# Patient Record
Sex: FEMALE | Race: WHITE | NOT HISPANIC OR LATINO | Employment: FULL TIME | URBAN - METROPOLITAN AREA
[De-identification: names, ages, dates, MRNs, and addresses within clinical notes are randomized per-mention and may not be internally consistent; named-entity substitution may affect disease eponyms.]

---

## 2017-01-21 ENCOUNTER — APPOINTMENT (OUTPATIENT)
Dept: RADIOLOGY | Facility: MEDICAL CENTER | Age: 27
End: 2017-01-21
Attending: EMERGENCY MEDICINE
Payer: MEDICAID

## 2017-01-21 ENCOUNTER — HOSPITAL ENCOUNTER (EMERGENCY)
Facility: MEDICAL CENTER | Age: 27
End: 2017-01-22
Attending: EMERGENCY MEDICINE
Payer: MEDICAID

## 2017-01-21 DIAGNOSIS — O23.41 UTI IN PREGNANCY, FIRST TRIMESTER: ICD-10-CM

## 2017-01-21 DIAGNOSIS — N39.0 URINARY TRACT INFECTION WITHOUT HEMATURIA, SITE UNSPECIFIED: ICD-10-CM

## 2017-01-21 DIAGNOSIS — O20.0 THREATENED MISCARRIAGE: ICD-10-CM

## 2017-01-21 DIAGNOSIS — N93.9 VAGINAL BLEEDING: ICD-10-CM

## 2017-01-21 LAB
ALBUMIN SERPL BCP-MCNC: 4.4 G/DL (ref 3.2–4.9)
ALBUMIN/GLOB SERPL: 1.4 G/DL
ALP SERPL-CCNC: 39 U/L (ref 30–99)
ALT SERPL-CCNC: 30 U/L (ref 2–50)
ANION GAP SERPL CALC-SCNC: 8 MMOL/L (ref 0–11.9)
AST SERPL-CCNC: 18 U/L (ref 12–45)
BASOPHILS # BLD AUTO: 0.8 % (ref 0–1.8)
BASOPHILS # BLD: 0.09 K/UL (ref 0–0.12)
BILIRUB SERPL-MCNC: 0.7 MG/DL (ref 0.1–1.5)
BUN SERPL-MCNC: 10 MG/DL (ref 8–22)
CALCIUM SERPL-MCNC: 9.6 MG/DL (ref 8.5–10.5)
CHLORIDE SERPL-SCNC: 97 MMOL/L (ref 96–112)
CO2 SERPL-SCNC: 20 MMOL/L (ref 20–33)
CREAT SERPL-MCNC: 0.64 MG/DL (ref 0.5–1.4)
EOSINOPHIL # BLD AUTO: 0.09 K/UL (ref 0–0.51)
EOSINOPHIL NFR BLD: 0.8 % (ref 0–6.9)
ERYTHROCYTE [DISTWIDTH] IN BLOOD BY AUTOMATED COUNT: 41.7 FL (ref 35.9–50)
GFR SERPL CREATININE-BSD FRML MDRD: >60 ML/MIN/1.73 M 2
GLOBULIN SER CALC-MCNC: 3.2 G/DL (ref 1.9–3.5)
GLUCOSE SERPL-MCNC: 93 MG/DL (ref 65–99)
HCT VFR BLD AUTO: 41.4 % (ref 37–47)
HGB BLD-MCNC: 13.8 G/DL (ref 12–16)
IMM GRANULOCYTES # BLD AUTO: 0.04 K/UL (ref 0–0.11)
IMM GRANULOCYTES NFR BLD AUTO: 0.4 % (ref 0–0.9)
LYMPHOCYTES # BLD AUTO: 2.96 K/UL (ref 1–4.8)
LYMPHOCYTES NFR BLD: 27.8 % (ref 22–41)
MCH RBC QN AUTO: 29.3 PG (ref 27–33)
MCHC RBC AUTO-ENTMCNC: 33.3 G/DL (ref 33.6–35)
MCV RBC AUTO: 87.9 FL (ref 81.4–97.8)
MONOCYTES # BLD AUTO: 0.8 K/UL (ref 0–0.85)
MONOCYTES NFR BLD AUTO: 7.5 % (ref 0–13.4)
NEUTROPHILS # BLD AUTO: 6.66 K/UL (ref 2–7.15)
NEUTROPHILS NFR BLD: 62.7 % (ref 44–72)
NRBC # BLD AUTO: 0 K/UL
NRBC BLD AUTO-RTO: 0 /100 WBC
NUMBER OF RH DOSES IND 8505RD: NORMAL
PLATELET # BLD AUTO: 297 K/UL (ref 164–446)
PMV BLD AUTO: 9.8 FL (ref 9–12.9)
POTASSIUM SERPL-SCNC: 4.6 MMOL/L (ref 3.6–5.5)
PROT SERPL-MCNC: 7.6 G/DL (ref 6–8.2)
RBC # BLD AUTO: 4.71 M/UL (ref 4.2–5.4)
RH BLD: NORMAL
SODIUM SERPL-SCNC: 125 MMOL/L (ref 135–145)
WBC # BLD AUTO: 10.6 K/UL (ref 4.8–10.8)

## 2017-01-21 PROCEDURE — 700105 HCHG RX REV CODE 258: Performed by: EMERGENCY MEDICINE

## 2017-01-21 PROCEDURE — 86901 BLOOD TYPING SEROLOGIC RH(D): CPT

## 2017-01-21 PROCEDURE — 96374 THER/PROPH/DIAG INJ IV PUSH: CPT

## 2017-01-21 PROCEDURE — 99284 EMERGENCY DEPT VISIT MOD MDM: CPT

## 2017-01-21 PROCEDURE — 85025 COMPLETE CBC W/AUTO DIFF WBC: CPT

## 2017-01-21 PROCEDURE — 84703 CHORIONIC GONADOTROPIN ASSAY: CPT

## 2017-01-21 PROCEDURE — 80053 COMPREHEN METABOLIC PANEL: CPT

## 2017-01-21 PROCEDURE — 96361 HYDRATE IV INFUSION ADD-ON: CPT

## 2017-01-21 PROCEDURE — 700111 HCHG RX REV CODE 636 W/ 250 OVERRIDE (IP): Performed by: EMERGENCY MEDICINE

## 2017-01-21 RX ORDER — ONDANSETRON 2 MG/ML
4 INJECTION INTRAMUSCULAR; INTRAVENOUS ONCE
Status: DISCONTINUED | OUTPATIENT
Start: 2017-01-21 | End: 2017-01-22 | Stop reason: HOSPADM

## 2017-01-21 RX ORDER — ONDANSETRON 2 MG/ML
4 INJECTION INTRAMUSCULAR; INTRAVENOUS ONCE
Status: COMPLETED | OUTPATIENT
Start: 2017-01-21 | End: 2017-01-21

## 2017-01-21 RX ORDER — SODIUM CHLORIDE 9 MG/ML
1000 INJECTION, SOLUTION INTRAVENOUS ONCE
Status: COMPLETED | OUTPATIENT
Start: 2017-01-21 | End: 2017-01-21

## 2017-01-21 RX ADMIN — SODIUM CHLORIDE 1000 ML: 9 INJECTION, SOLUTION INTRAVENOUS at 22:58

## 2017-01-21 RX ADMIN — ONDANSETRON 4 MG: 2 INJECTION, SOLUTION INTRAMUSCULAR; INTRAVENOUS at 22:58

## 2017-01-21 NOTE — ED AVS SNAPSHOT
After Visit Summary                                                                                                                Natalya Salguero   MRN: [id number]    Department:  [facility], Emergency Dept   Date of Visit:  1/21/2017            [facility], Emergency Dept    [address]    [place] [address]    Phone:  [phone]      You were seen by     Larissa Vazquez M.D.      Your Diagnosis Was     Threatened miscarriage     O20.0       These are the medications you received during your hospitalization from 01/21/2017 1939 to 01/22/2017 0102     Date/Time Order Dose Route Action    01/21/2017 2258 NS infusion 1,000 mL 1,000 mL Intravenous New Bag    01/21/2017 2258 ondansetron (ZOFRAN) syringe/vial injection 4 mg 4 mg Intravenous Given      Follow-up Information     1. Follow up with Please follow up with your OB as scheduled .      Medication Information     Review all of your home medications and newly ordered medications with your primary doctor and/or pharmacist as soon as possible. Follow medication instructions as directed by your doctor and/or pharmacist.     Please keep your complete medication list with you and share with your physician. Update the information when medications are discontinued, doses are changed, or new medications (including over-the-counter products) are added; and carry medication information at all times in the event of emergency situations.               Medication List      START taking these medications        Instructions    nitrofurantoin monohydr macro 100 MG Caps   Commonly known as:  MACROBID    Take 1 Cap by mouth 2 times a day for 10 days.   Dose:  100 mg         ASK your doctor about these medications        Instructions    albuterol 108 (90 BASE) MCG/ACT Aers inhalation aerosol    Inhale 2 Puffs by mouth every 6 hours as needed for Shortness of Breath.   Dose:  2 Puff       buPROPion 75 MG Tabs   Commonly known as:   WELLBUTRIN    TAKE 1 TABLET(S) TWICE A DAY BY ORAL ROUTE AS DIRECTED FOR 30 DAYS.       busPIRone 5 MG Tabs   Commonly known as:  BUSPAR    TAKE 1 TABLET(S) 3 TIMES A DAY BY ORAL ROUTE AS DIRECTED FOR 30 DAYS.       Misc. Devices Misc    Doctor's comments:  Wrist brace   As directed       omeprazole 20 MG tablet   Commonly known as:  PRILOSEC OTC    Take 1 Tab by mouth 1 time daily as needed.   Dose:  20 mg               Procedures and tests performed during your visit     BETA-HCG QUALITATIVE SERUM    CBC WITH DIFFERENTIAL    COMP METABOLIC PANEL    ESTIMATED GFR    IV Saline Lock    REFRACTOMETER SG    RH TYPE FOR RHOGAM FROM E.D.    Set Up for Pelvic Exam    URINALYSIS CULTURE, IF INDICATED    URINE MICROSCOPIC (W/UA)    US-OB PELVIS TRANSVAGINAL        Discharge Instructions       Pregnancy and Urinary Tract Infection  A urinary tract infection (UTI) is a bacterial infection of the urinary tract. Infection of the urinary tract can include the ureters, kidneys (pyelonephritis), bladder (cystitis), and urethra (urethritis). All pregnant women should be screened for bacteria in the urinary tract. Identifying and treating a UTI will decrease the risk of  labor and developing more serious infections in both the mother and baby.  CAUSES  Bacteria germs cause almost all UTIs.   RISK FACTORS  Many factors can increase your chances of getting a UTI during pregnancy. These include:  · Having a short urethra.  · Poor toilet and hygiene habits.  · Sexual intercourse.  · Blockage of urine along the urinary tract.  · Problems with the pelvic muscles or nerves.  · Diabetes.  · Obesity.  · Bladder problems after having several children.  · Previous history of UTI.  SIGNS AND SYMPTOMS   · Pain, burning, or a stinging feeling when urinating.  · Suddenly feeling the need to urinate right away (urgency).  · Loss of bladder control (urinary incontinence).  · Frequent urination, more than is common with pregnancy.  · Lower  abdominal or back discomfort.  · Cloudy urine.  · Blood in the urine (hematuria).  · Fever.   When the kidneys are infected, the symptoms may be:  · Back pain.  · Flank pain on the right side more so than the left.  · Fever.  · Chills.  · Nausea.  · Vomiting.  DIAGNOSIS   A urinary tract infection is usually diagnosed through urine tests. Additional tests and procedures are sometimes done. These may include:  · Ultrasound exam of the kidneys, ureters, bladder, and urethra.  · Looking in the bladder with a lighted tube (cystoscopy).  TREATMENT  Typically, UTIs can be treated with antibiotic medicines.   HOME CARE INSTRUCTIONS   · Only take over-the-counter or prescription medicines as directed by your health care provider. If you were prescribed antibiotics, take them as directed. Finish them even if you start to feel better.  · Drink enough fluids to keep your urine clear or pale yellow.  · Do not have sexual intercourse until the infection is gone and your health care provider says it is okay.  · Make sure you are tested for UTIs throughout your pregnancy. These infections often come back.   Preventing a UTI in the Future  · Practice good toilet habits. Always wipe from front to back. Use the tissue only once.  · Do not hold your urine. Empty your bladder as soon as possible when the urge comes.  · Do not douche or use deodorant sprays.  · Wash with soap and warm water around the genital area and the anus.  · Empty your bladder before and after sexual intercourse.  · Wear underwear with a cotton crotch.  · Avoid caffeine and carbonated drinks. They can irritate the bladder.  · Drink cranberry juice or take cranberry pills. This may decrease the risk of getting a UTI.  · Do not drink alcohol.  · Keep all your appointments and tests as scheduled.   SEEK MEDICAL CARE IF:   · Your symptoms get worse.  · You are still having fevers 2 or more days after treatment begins.  · You have a rash.  · You feel that you are  having problems with medicines prescribed.  · You have abnormal vaginal discharge.  SEEK IMMEDIATE MEDICAL CARE IF:   · You have back or flank pain.  · You have chills.  · You have blood in your urine.  · You have nausea and vomiting.  · You have contractions of your uterus.  · You have a gush of fluid from the vagina.  MAKE SURE YOU:  · Understand these instructions.    · Will watch your condition.    · Will get help right away if you are not doing well or get worse.       This information is not intended to replace advice given to you by your health care provider. Make sure you discuss any questions you have with your health care provider.     Document Released: 04/13/2012 Document Revised: 10/08/2014 Document Reviewed: 07/17/2014  [employer] Interactive Patient Education ©2016 [employer] Inc.    Threatened Miscarriage  A threatened miscarriage is when you have vaginal bleeding during your first 20 weeks of pregnancy but the pregnancy has not ended. Your doctor will do tests to make sure you are still pregnant. The cause of the bleeding may not be known. This condition does not mean your pregnancy will end. It does increase the risk of it ending (complete miscarriage).  HOME CARE   · Make sure you keep all your doctor visits for prenatal care.  · Get plenty of rest.  · Do not have sex or use tampons if you have vaginal bleeding.  · Do not douche.  · Do not smoke or use drugs.  · Do not drink alcohol.  · Avoid caffeine.  GET HELP IF:  · You have light bleeding from your vagina.  · You have belly pain or cramping.  · You have a fever.  GET HELP RIGHT AWAY IF:   · You have heavy bleeding from your vagina.  · You have clots of blood coming from your vagina.  · You have bad pain or cramps in your low back or belly.  · You have fever, chills, and bad belly pain.  MAKE SURE YOU:   · Understand these instructions.  · Will watch your condition.  · Will get help right away if you are not doing well or get worse.     This  information is not intended to replace advice given to you by your health care provider. Make sure you discuss any questions you have with your health care provider.     Document Released: 11/30/2009 Document Revised: 12/23/2014 Document Reviewed: 10/14/2014  Elsevier Interactive Patient Education ©2016 [employer]            Patient Information     Patient Information    Following emergency treatment: all patient requiring follow-up care must return either to a private physician or a clinic if your condition worsens before you are able to obtain further medical attention, please return to the emergency room.     Billing Information    At [facility] at [facility], we work to make the billing process streamlined for our patients.  Our Representatives are here to answer any questions you may have regarding your hospital bill.  If you have insurance coverage and have supplied your insurance information to us, we will submit a claim to your insurer on your behalf.  Should you have any questions regarding your bill, we can be reached online or by phone as follows:  Online: You are able pay your bills online or live chat with our representatives about any billing questions you may have. We are here to help Monday - Friday from 8:00am to 7:30pm and 9:00am - 12:00pm on Saturdays.  Please visit [email or web address]  for more information.   Phone:  [phone] or [phone]    Please note that your emergency physician, surgeon, pathologist, radiologist, anesthesiologist, and other specialists are not employed by [facility] and will therefore bill separately for their services.  Please contact them directly for any questions concerning their bills at the numbers below:     Emergency Physician Services:  [phone]  [facility]:  [phone]  [facility]:  [phone]  [facility]:  [phone]    1. Your final bill may vary from the amount quoted  upon discharge if all procedures are not complete at that time, or if your doctor has additional procedures of which we are not aware. You will receive an additional bill if you return to the Emergency Department at [facility] for suture removal regardless of the facility of which the sutures were placed.     2. Please arrange for settlement of this account at the emergency registration.    3. All self-pay accounts are due in full at the time of treatment.  If you are unable to meet this obligation then payment is expected within 4-5 days.     4. If you have had radiology studies (CT, X-ray, Ultrasound, MRI), you have received a preliminary result during your emergency department visit. Please contact the radiology department [phone] to receive a copy of your final result. Please discuss the Final result with your primary physician or with the follow up physician provided.     Crisis Hotline:  [place] Crisis Hotline:  6-916-UGGMXYJ or 1-692.969.8923  [place] Crisis Hotline:    1-157.457.4072 or 991-737-1986         ED Discharge Follow Up Questions    1. In order to provide you with very good care, we would like to follow up with a phone call in the next few days.  May we have your permission to contact you?     YES /  NO    2. What is the best phone number to call you? (       )_____-__________    3. What is the best time to call you?      Morning  /  Afternoon  /  Evening                   Patient Signature:  ____________________________________________________________    Date:  ____________________________________________________________

## 2017-01-21 NOTE — ED AVS SNAPSHOT
1/22/2017          Natalya Salguero  [address]   [address]  [place] [address]    Dear Natalya:    [facility] wants to ensure your discharge home is safe and you or your loved ones have had all your questions answered regarding your care after you leave the hospital.    You may receive a telephone call within two days of your discharge.  This call is to make certain you understand your discharge instructions as well as ensure we provided you with the best care possible during your stay with us.     The call will only last approximately 3-5 minutes and will be done by a nurse.    Once again, we want to ensure your discharge home is safe and that you have a clear understanding of any next steps in your care.  If you have any questions or concerns, please do not hesitate to contact us, we are here for you.  Thank you for choosing [facility] for your healthcare needs.    Sincerely,    Zachary Mcclellan    [facility]

## 2017-01-21 NOTE — ED AVS SNAPSHOT
[facility] [employer] Access Code: [email or web address]  Expires: 2/21/2017  1:02 AM    [facility]  A secure, online tool to manage your health information     [employer]’s [employer]® is a secure, online tool that connects you to your personalized health information from the privacy of your home -- day or night - making it very easy for you to manage your healthcare. Once the activation process is completed, you can even access your medical information using the [employer] jae, which is available for free in the Apple Jae store or Google Play store.     [facility] [facility] provides the following levels of access (as shown below):   My Chart Features   [facility] Primary Care Doctor [facility]  Specialists [facility]  Urgent  Care Non-[facility]  Primary Care  Doctor   Email your healthcare team securely and privately 24/7 X X X X   Manage appointments: schedule your next appointment; view details of past/upcoming appointments X      Request prescription refills. X      View recent personal medical records, including lab and immunizations X X X X   View health record, including health history, allergies, medications X X X X   Read reports about your outpatient visits, procedures, consult and ER notes X X X X   See your discharge summary, which is a recap of your hospital and/or ER visit that includes your diagnosis, lab results, and care plan. X X       How to register for [employer]:  1. Go to  [email or web address].  2. Click on the Sign Up Now box, which takes you to the New Member Sign Up page. You will need to provide the following information:  a. Enter your [employer] [employer] Access Code exactly as it appears at the top of this page. (You will not need to use this code after you’ve completed the sign-up process. If you do not sign up before the expiration date, you must request a new code.)   b. Enter your date of birth.   c. Enter your home email address.   d. Click Submit, and follow the next screen’s instructions.  3. Create a [facility] ID. This will be your [facility]  login ID and cannot be changed, so think of one that is secure and easy to remember.  4. Create a [employer] password. You can change your password at any time.  5. Enter your Password Reset Question and Answer. This can be used at a later time if you forget your password.   6. Enter your e-mail address. This allows you to receive e-mail notifications when new information is available in [employer].  7. Click Sign Up. You can now view your health information.    For assistance activating your [employer] account, call [phone]

## 2017-01-22 VITALS
HEIGHT: 62 IN | SYSTOLIC BLOOD PRESSURE: 102 MMHG | OXYGEN SATURATION: 97 % | TEMPERATURE: 97.5 F | BODY MASS INDEX: 31.73 KG/M2 | WEIGHT: 172.4 LBS | DIASTOLIC BLOOD PRESSURE: 68 MMHG | RESPIRATION RATE: 14 BRPM | HEART RATE: 65 BPM

## 2017-01-22 LAB
APPEARANCE UR: CLEAR
BACTERIA #/AREA URNS HPF: ABNORMAL /HPF
BILIRUB UR QL STRIP.AUTO: NEGATIVE
COLOR UR: YELLOW
CULTURE IF INDICATED INDCX: YES UA CULTURE
EPI CELLS #/AREA URNS HPF: ABNORMAL /HPF
GLUCOSE UR STRIP.AUTO-MCNC: NEGATIVE MG/DL
HCG SERPL QL: POSITIVE
HYALINE CASTS #/AREA URNS LPF: ABNORMAL /LPF
KETONES UR STRIP.AUTO-MCNC: 80 MG/DL
LEUKOCYTE ESTERASE UR QL STRIP.AUTO: ABNORMAL
MICRO URNS: ABNORMAL
MUCOUS THREADS #/AREA URNS HPF: ABNORMAL /HPF
NITRITE UR QL STRIP.AUTO: NEGATIVE
PH UR STRIP.AUTO: 5.5 [PH]
PROT UR QL STRIP: NEGATIVE MG/DL
RBC # URNS HPF: ABNORMAL /HPF
RBC UR QL AUTO: NEGATIVE
SP GR UR REFRACTOMETRY: 1.02
SP GR UR STRIP.AUTO: 1.02
WBC #/AREA URNS HPF: ABNORMAL /HPF

## 2017-01-22 PROCEDURE — 81001 URINALYSIS AUTO W/SCOPE: CPT

## 2017-01-22 PROCEDURE — 76817 TRANSVAGINAL US OBSTETRIC: CPT

## 2017-01-22 PROCEDURE — 87086 URINE CULTURE/COLONY COUNT: CPT

## 2017-01-22 RX ORDER — NITROFURANTOIN 25; 75 MG/1; MG/1
100 CAPSULE ORAL 2 TIMES DAILY
Qty: 20 CAP | Refills: 0 | Status: SHIPPED | OUTPATIENT
Start: 2017-01-22 | End: 2017-02-01

## 2017-01-22 ASSESSMENT — ENCOUNTER SYMPTOMS
ABDOMINAL PAIN: 0
DIZZINESS: 0
FEVER: 0
COUGH: 0
NAUSEA: 1

## 2017-01-22 NOTE — ED NOTES
Discharge orders received, IV and monitor discontinued, instructions and education given, follow-up appointments discussed, pt verbalized understanding.

## 2017-01-22 NOTE — ED NOTES
"Chief Complaint   Patient presents with   • Pregnancy     6 weeks, unplanned pregnancy. pt states she has an ob/gyn    • Vaginal Bleeding     started 1930 while grocery shopping. \"its flowing  and soaking pads\"   • Cramping     started at 1900. pain in the middle.    • N/V       "

## 2017-01-22 NOTE — ED PROVIDER NOTES
"ED Provider Note    ED Provider Note        CHIEF COMPLAINT  Chief Complaint   Patient presents with   • Pregnancy     6 weeks, unplanned pregnancy. pt states she has an ob/gyn    • Vaginal Bleeding     started 1930 while grocery shopping. \"its flowing  and soaking pads\"   • Cramping     started at 1900. pain in the middle.    • N/V       HPI  Natalya Salguero is a 26 y.o. female who presents to the Emergency Department for concern of vaginal bleeding. She is a  who is approximately 6 weeks pregnant based on her last menstrual period. She says earlier she started having some spotting followed by a few heavier episodes around 1930 but now her bleeding has almost subsided. She has some reported nausea and one episode of emesis earlier today but no other significant abdominal pain or cramping. She is an appointment with her ObGyn in February.    REVIEW OF SYSTEMS  Review of Systems   Constitutional: Negative for fever.   HENT: Negative for congestion.    Respiratory: Negative for cough.    Gastrointestinal: Positive for nausea. Negative for abdominal pain.   Genitourinary: Positive for vaginal bleeding. Negative for difficulty urinating.   Skin: Negative for pallor.   Neurological: Negative for dizziness.       PAST MEDICAL HISTORY   has a past medical history of Ulcer (CMS-HCC) (2009); Pregnancy; Gastric ulcer, chronic; Depression; and Anxiety.    SURGICAL HISTORY   has past surgical history that includes laparoscopy (3/22/2010); abdominal exploration (2009); and primary c section (2012).    SOCIAL HISTORY  Social History   Substance Use Topics   • Smoking status: Former Smoker   • Smokeless tobacco: Never Used   • Alcohol Use: 0.0 oz/week     0 Standard drinks or equivalent per week      Comment: socially      History   Drug Use No       FAMILY HISTORY  Family History   Problem Relation Age of Onset   • Diabetes Mother    • Hyperlipidemia Mother    • Hypertension Mother    • Heart Disease Mother  " "  • Hyperlipidemia Father    • Cancer Maternal Grandmother      lung cancer       CURRENT MEDICATIONS  Reviewed.  See Encounter Summary.     ALLERGIES  No Known Allergies    PHYSICAL EXAM  VITAL SIGNS: /68 mmHg  Pulse 65  Temp(Src) 36.4 °C (97.5 °F)  Resp 14  Ht 1.575 m (5' 2\")  Wt 78.2 kg (172 lb 6.4 oz)  BMI 31.52 kg/m2  SpO2 97%  LMP 12/09/2016  Physical Exam   Constitutional: She is oriented to person, place, and time.   HENT:   Head: Normocephalic and atraumatic.   Eyes: Pupils are equal, round, and reactive to light.   Neck: Normal range of motion.   Pulmonary/Chest: Effort normal and breath sounds normal.   Abdominal: Soft. There is no tenderness.   Genitourinary:   Os is closed and no blood in the vaginal vault   Musculoskeletal: Normal range of motion.   Neurological: She is alert and oriented to person, place, and time.   Skin: Skin is warm. She is not diaphoretic.   Psychiatric: She has a normal mood and affect.           DIAGNOSTIC STUDIES / PROCEDURES     LABS  Results for orders placed or performed during the hospital encounter of 01/21/17   CBC WITH DIFFERENTIAL   Result Value Ref Range    WBC 10.6 4.8 - 10.8 K/uL    RBC 4.71 4.20 - 5.40 M/uL    Hemoglobin 13.8 12.0 - 16.0 g/dL    Hematocrit 41.4 37.0 - 47.0 %    MCV 87.9 81.4 - 97.8 fL    MCH 29.3 27.0 - 33.0 pg    MCHC 33.3 (L) 33.6 - 35.0 g/dL    RDW 41.7 35.9 - 50.0 fL    Platelet Count 297 164 - 446 K/uL    MPV 9.8 9.0 - 12.9 fL    Neutrophils-Polys 62.70 44.00 - 72.00 %    Lymphocytes 27.80 22.00 - 41.00 %    Monocytes 7.50 0.00 - 13.40 %    Eosinophils 0.80 0.00 - 6.90 %    Basophils 0.80 0.00 - 1.80 %    Immature Granulocytes 0.40 0.00 - 0.90 %    Nucleated RBC 0.00 /100 WBC    Neutrophils (Absolute) 6.66 2.00 - 7.15 K/uL    Lymphs (Absolute) 2.96 1.00 - 4.80 K/uL    Monos (Absolute) 0.80 0.00 - 0.85 K/uL    Eos (Absolute) 0.09 0.00 - 0.51 K/uL    Baso (Absolute) 0.09 0.00 - 0.12 K/uL    Immature Granulocytes (abs) 0.04 0.00 - " 0.11 K/uL    NRBC (Absolute) 0.00 K/uL   COMP METABOLIC PANEL   Result Value Ref Range    Co2 20 20 - 33 mmol/L    Glucose 93 65 - 99 mg/dL    Bun 10 8 - 22 mg/dL    Creatinine 0.64 0.50 - 1.40 mg/dL    Calcium 9.6 8.5 - 10.5 mg/dL    ALT(SGPT) 30 2 - 50 U/L    Total Bilirubin 0.7 0.1 - 1.5 mg/dL    Total Protein 7.6 6.0 - 8.2 g/dL    Sodium 125 (L) 135 - 145 mmol/L    Potassium 4.6 3.6 - 5.5 mmol/L    Chloride 97 96 - 112 mmol/L    Anion Gap 8.0 0.0 - 11.9    AST(SGOT) 18 12 - 45 U/L    Alkaline Phosphatase 39 30 - 99 U/L    Albumin 4.4 3.2 - 4.9 g/dL    Globulin 3.2 1.9 - 3.5 g/dL    A-G Ratio 1.4 g/dL   URINALYSIS CULTURE, IF INDICATED   Result Value Ref Range    Micro Urine Req Microscopic     Color Yellow     Character Clear     Specific Gravity 1.021 <1.035    Ph 5.5 5.0-8.0    Glucose Negative Negative mg/dL    Ketones 80 (A) Negative mg/dL    Protein Negative Negative mg/dL    Bilirubin Negative Negative    Nitrite Negative Negative    Leukocyte Esterase Moderate (A) Negative    Occult Blood Negative Negative    Culture Indicated Yes UA Culture   RH TYPE FOR RHOGAM FROM E.D.   Result Value Ref Range    Emergency Department Rh Typing POS     Number Of Rh Doses Indicated ZERO    REFRACTOMETER SG   Result Value Ref Range    Specific Gravity 1.025    ESTIMATED GFR   Result Value Ref Range    GFR If African American >60 >60 mL/min/1.73 m 2    GFR If Non African American >60 >60 mL/min/1.73 m 2   BETA-HCG QUALITATIVE SERUM   Result Value Ref Range    Beta-Hcg Qualitative Serum Positive (A) Negative   URINE MICROSCOPIC (W/UA)   Result Value Ref Range    WBC 5-10 (A) /hpf    RBC 2-5 (A) /hpf    Bacteria Few (A) None /hpf    Epithelial Cells Few /hpf    Mucous Threads Few /hpf    Hyaline Cast 3-5 (A) /lpf       All labs were reviewed by me.      RADIOLOGY  US-OB PELVIS TRANSVAGINAL   Final Result      1.  Viable single intrauterine gestation of an estimated gestational age of 5 weeks 7 days for an WIN of 9/17/2017.    2.  Small subchorionic hemorrhage. OB consultation and close follow-up is recommended.        The radiologist's interpretation of all radiological studies have been reviewed by me.    COURSE & MEDICAL DECISION MAKING  Pertinent Labs & Imaging studies reviewed. (See chart for details)    10:28 PM - Patient seen and examined at bedside.     Decision Making:  This is a 26 y.o. year old female who presents with vaginal bleeding in the 1st trimester. She presents here in no acute distress and otherwise stable. Her os is closed with no significant vaginal bleeding. Obviously ectopic pregnancy versus threatened  versus completed  or ectopic differential. Ultrasound did show a viable intrauterine pregnancy that does correlate closely with her last menstrual period period. There was possible small subchorionic hemorrhage. Her bleeding had subsided here and she denies any significant anemia. She'll be discharged home with pelvic rest and follow up closely with her OB with precautions for a threatened miscarriage. She did have some bacteria in her urine and she'll be treated for asymptomatic UTI in pregnancy.    FINAL IMPRESSION  1. Threatened miscarriage    2. Vaginal bleeding    3. Urinary tract infection without hematuria, site unspecified    4. UTI in pregnancy, first trimester

## 2017-01-22 NOTE — DISCHARGE INSTRUCTIONS
Pregnancy and Urinary Tract Infection  A urinary tract infection (UTI) is a bacterial infection of the urinary tract. Infection of the urinary tract can include the ureters, kidneys (pyelonephritis), bladder (cystitis), and urethra (urethritis). All pregnant women should be screened for bacteria in the urinary tract. Identifying and treating a UTI will decrease the risk of  labor and developing more serious infections in both the mother and baby.  CAUSES  Bacteria germs cause almost all UTIs.   RISK FACTORS  Many factors can increase your chances of getting a UTI during pregnancy. These include:  · Having a short urethra.  · Poor toilet and hygiene habits.  · Sexual intercourse.  · Blockage of urine along the urinary tract.  · Problems with the pelvic muscles or nerves.  · Diabetes.  · Obesity.  · Bladder problems after having several children.  · Previous history of UTI.  SIGNS AND SYMPTOMS   · Pain, burning, or a stinging feeling when urinating.  · Suddenly feeling the need to urinate right away (urgency).  · Loss of bladder control (urinary incontinence).  · Frequent urination, more than is common with pregnancy.  · Lower abdominal or back discomfort.  · Cloudy urine.  · Blood in the urine (hematuria).  · Fever.   When the kidneys are infected, the symptoms may be:  · Back pain.  · Flank pain on the right side more so than the left.  · Fever.  · Chills.  · Nausea.  · Vomiting.  DIAGNOSIS   A urinary tract infection is usually diagnosed through urine tests. Additional tests and procedures are sometimes done. These may include:  · Ultrasound exam of the kidneys, ureters, bladder, and urethra.  · Looking in the bladder with a lighted tube (cystoscopy).  TREATMENT  Typically, UTIs can be treated with antibiotic medicines.   HOME CARE INSTRUCTIONS   · Only take over-the-counter or prescription medicines as directed by your health care provider. If you were prescribed antibiotics, take them as directed. Finish  them even if you start to feel better.  · Drink enough fluids to keep your urine clear or pale yellow.  · Do not have sexual intercourse until the infection is gone and your health care provider says it is okay.  · Make sure you are tested for UTIs throughout your pregnancy. These infections often come back.   Preventing a UTI in the Future  · Practice good toilet habits. Always wipe from front to back. Use the tissue only once.  · Do not hold your urine. Empty your bladder as soon as possible when the urge comes.  · Do not douche or use deodorant sprays.  · Wash with soap and warm water around the genital area and the anus.  · Empty your bladder before and after sexual intercourse.  · Wear underwear with a cotton crotch.  · Avoid caffeine and carbonated drinks. They can irritate the bladder.  · Drink cranberry juice or take cranberry pills. This may decrease the risk of getting a UTI.  · Do not drink alcohol.  · Keep all your appointments and tests as scheduled.   SEEK MEDICAL CARE IF:   · Your symptoms get worse.  · You are still having fevers 2 or more days after treatment begins.  · You have a rash.  · You feel that you are having problems with medicines prescribed.  · You have abnormal vaginal discharge.  SEEK IMMEDIATE MEDICAL CARE IF:   · You have back or flank pain.  · You have chills.  · You have blood in your urine.  · You have nausea and vomiting.  · You have contractions of your uterus.  · You have a gush of fluid from the vagina.  MAKE SURE YOU:  · Understand these instructions.    · Will watch your condition.    · Will get help right away if you are not doing well or get worse.       This information is not intended to replace advice given to you by your health care provider. Make sure you discuss any questions you have with your health care provider.     Document Released: 04/13/2012 Document Revised: 10/08/2014 Document Reviewed: 07/17/2014  [employer] Interactive Patient Education ©2016 [employer]    Threatened Miscarriage  A threatened miscarriage is when you have vaginal bleeding during your first 20 weeks of pregnancy but the pregnancy has not ended. Your doctor will do tests to make sure you are still pregnant. The cause of the bleeding may not be known. This condition does not mean your pregnancy will end. It does increase the risk of it ending (complete miscarriage).  HOME CARE   · Make sure you keep all your doctor visits for prenatal care.  · Get plenty of rest.  · Do not have sex or use tampons if you have vaginal bleeding.  · Do not douche.  · Do not smoke or use drugs.  · Do not drink alcohol.  · Avoid caffeine.  GET HELP IF:  · You have light bleeding from your vagina.  · You have belly pain or cramping.  · You have a fever.  GET HELP RIGHT AWAY IF:   · You have heavy bleeding from your vagina.  · You have clots of blood coming from your vagina.  · You have bad pain or cramps in your low back or belly.  · You have fever, chills, and bad belly pain.  MAKE SURE YOU:   · Understand these instructions.  · Will watch your condition.  · Will get help right away if you are not doing well or get worse.     This information is not intended to replace advice given to you by your health care provider. Make sure you discuss any questions you have with your health care provider.     Document Released: 11/30/2009 Document Revised: 12/23/2014 Document Reviewed: 10/14/2014  [employer] Interactive Patient Education ©2016 [employer]

## 2017-01-22 NOTE — ED NOTES
Pt states that the bleeding has stopped and she is now just spotting, pt states she still has severe nausea and cramping, VSS.

## 2017-01-24 LAB
BACTERIA UR CULT: NORMAL
SIGNIFICANT IND 70042: NORMAL
SITE SITE: NORMAL
SOURCE SOURCE: NORMAL

## 2017-03-03 ENCOUNTER — HOSPITAL ENCOUNTER (OUTPATIENT)
Dept: LAB | Facility: MEDICAL CENTER | Age: 27
End: 2017-03-03
Attending: SPECIALIST
Payer: MEDICAID

## 2017-03-03 LAB
ABO GROUP BLD: NORMAL
BASOPHILS # BLD AUTO: 0.04 K/UL (ref 0–0.12)
BASOPHILS NFR BLD AUTO: 0.5 % (ref 0–1.8)
BLD GP AB SCN SERPL QL: NORMAL
EOSINOPHIL # BLD: 0.11 K/UL (ref 0–0.51)
EOSINOPHIL NFR BLD AUTO: 1.3 % (ref 0–6.9)
ERYTHROCYTE [DISTWIDTH] IN BLOOD BY AUTOMATED COUNT: 43.3 FL (ref 35.9–50)
HBV SURFACE AG SERPL QL IA: NEGATIVE
HCT VFR BLD AUTO: 37.9 % (ref 37–47)
HGB BLD-MCNC: 12.3 G/DL (ref 12–16)
HIV 1+2 AB+HIV1 P24 AG SERPL QL IA: NON REACTIVE
IMM GRANULOCYTES # BLD AUTO: 0.02 K/UL (ref 0–0.11)
IMM GRANULOCYTES NFR BLD AUTO: 0.2 % (ref 0–0.9)
LYMPHOCYTES # BLD: 2.11 K/UL (ref 1–4.8)
LYMPHOCYTES NFR BLD AUTO: 24.1 % (ref 22–41)
MCH RBC QN AUTO: 29.1 PG (ref 27–33)
MCHC RBC AUTO-ENTMCNC: 32.5 G/DL (ref 33.6–35)
MCV RBC AUTO: 89.6 FL (ref 81.4–97.8)
MONOCYTES # BLD: 0.65 K/UL (ref 0–0.85)
MONOCYTES NFR BLD AUTO: 7.4 % (ref 0–13.4)
NEUTROPHILS # BLD: 5.81 K/UL (ref 2–7.15)
NEUTROPHILS NFR BLD AUTO: 66.5 % (ref 44–72)
NRBC # BLD AUTO: 0 K/UL
NRBC BLD-RTO: 0 /100 WBC
PLATELET # BLD AUTO: 274 K/UL (ref 164–446)
PMV BLD AUTO: 10 FL (ref 9–12.9)
RBC # BLD AUTO: 4.23 M/UL (ref 4.2–5.4)
RH BLD: NORMAL
RUBV IGG SERPL IA-ACNC: 45.4 IU/ML
TREPONEMA PALLIDUM IGG+IGM AB [PRESENCE] IN SERUM OR PLASMA BY IMMUNOASSAY: NON REACTIVE
WBC # BLD AUTO: 8.7 K/UL (ref 4.8–10.8)

## 2017-03-03 PROCEDURE — 86900 BLOOD TYPING SEROLOGIC ABO: CPT

## 2017-03-03 PROCEDURE — 87591 N.GONORRHOEAE DNA AMP PROB: CPT

## 2017-03-03 PROCEDURE — 86780 TREPONEMA PALLIDUM: CPT

## 2017-03-03 PROCEDURE — 36415 COLL VENOUS BLD VENIPUNCTURE: CPT

## 2017-03-03 PROCEDURE — 87389 HIV-1 AG W/HIV-1&-2 AB AG IA: CPT

## 2017-03-03 PROCEDURE — 85025 COMPLETE CBC W/AUTO DIFF WBC: CPT

## 2017-03-03 PROCEDURE — 86901 BLOOD TYPING SEROLOGIC RH(D): CPT

## 2017-03-03 PROCEDURE — 87491 CHLMYD TRACH DNA AMP PROBE: CPT

## 2017-03-03 PROCEDURE — 86850 RBC ANTIBODY SCREEN: CPT

## 2017-03-03 PROCEDURE — 87624 HPV HI-RISK TYP POOLED RSLT: CPT

## 2017-03-03 PROCEDURE — 87340 HEPATITIS B SURFACE AG IA: CPT

## 2017-03-03 PROCEDURE — 88175 CYTOPATH C/V AUTO FLUID REDO: CPT

## 2017-03-03 PROCEDURE — 86762 RUBELLA ANTIBODY: CPT

## 2017-03-10 LAB
C TRACH DNA GENITAL QL NAA+PROBE: NEGATIVE
CYTOLOGY REG CYTOL: ABNORMAL
HPV HR 12 DNA CVX QL NAA+PROBE: POSITIVE
HPV16 DNA SPEC QL NAA+PROBE: NEGATIVE
HPV18 DNA SPEC QL NAA+PROBE: NEGATIVE
N GONORRHOEA DNA GENITAL QL NAA+PROBE: NEGATIVE
SPECIMEN SOURCE: ABNORMAL
SPECIMEN SOURCE: ABNORMAL

## 2017-05-03 ENCOUNTER — TELEPHONE (OUTPATIENT)
Dept: MEDICAL GROUP | Facility: MEDICAL CENTER | Age: 27
End: 2017-05-03

## 2017-05-03 NOTE — TELEPHONE ENCOUNTER
Pt called requesting valacyclovir. Pt was advised that she would need to schedule an appointment for a a Rx. Pt stated that she was also pregnant and was not taking any of her current meds. Pt was again advised that she needs to schedule an appointment to discuss her pregnancy with Dr Andrews. Pt stated she would not be able to schedule an appoint till Friday may 12.

## 2017-05-09 ENCOUNTER — TELEPHONE (OUTPATIENT)
Dept: MEDICAL GROUP | Facility: MEDICAL CENTER | Age: 27
End: 2017-05-09

## 2017-05-09 DIAGNOSIS — F41.9 ANXIETY: ICD-10-CM

## 2017-05-09 NOTE — TELEPHONE ENCOUNTER
[facility] [employer] She needs a new referral. She states that she is pregnant and she is no longer on her anxiety medications.

## 2017-05-12 ENCOUNTER — HOSPITAL ENCOUNTER (EMERGENCY)
Facility: MEDICAL CENTER | Age: 27
End: 2017-05-12
Attending: EMERGENCY MEDICINE
Payer: MEDICAID

## 2017-05-12 ENCOUNTER — HOSPITAL ENCOUNTER (OUTPATIENT)
Facility: MEDICAL CENTER | Age: 27
End: 2017-05-12
Attending: SPECIALIST | Admitting: SPECIALIST
Payer: MEDICAID

## 2017-05-12 VITALS
SYSTOLIC BLOOD PRESSURE: 115 MMHG | DIASTOLIC BLOOD PRESSURE: 64 MMHG | OXYGEN SATURATION: 98 % | WEIGHT: 168.87 LBS | RESPIRATION RATE: 14 BRPM | HEIGHT: 62 IN | HEART RATE: 68 BPM | TEMPERATURE: 97.4 F | BODY MASS INDEX: 31.08 KG/M2

## 2017-05-12 VITALS
WEIGHT: 169 LBS | SYSTOLIC BLOOD PRESSURE: 99 MMHG | HEART RATE: 72 BPM | BODY MASS INDEX: 31.1 KG/M2 | DIASTOLIC BLOOD PRESSURE: 57 MMHG | RESPIRATION RATE: 16 BRPM | HEIGHT: 62 IN | TEMPERATURE: 97.7 F

## 2017-05-12 DIAGNOSIS — R10.9 ABDOMINAL PAIN DURING PREGNANCY, SECOND TRIMESTER: ICD-10-CM

## 2017-05-12 DIAGNOSIS — O26.892 ABDOMINAL PAIN DURING PREGNANCY, SECOND TRIMESTER: ICD-10-CM

## 2017-05-12 LAB
APPEARANCE UR: CLEAR
COLOR UR AUTO: YELLOW
GLUCOSE UR QL STRIP.AUTO: NEGATIVE MG/DL
KETONES UR QL STRIP.AUTO: NEGATIVE MG/DL
LEUKOCYTE ESTERASE UR QL STRIP.AUTO: NEGATIVE
NITRITE UR QL STRIP.AUTO: NEGATIVE
PH UR STRIP.AUTO: 6.5 [PH]
PROT UR QL STRIP: ABNORMAL MG/DL
RBC UR QL AUTO: NEGATIVE
SP GR UR: >=1.03

## 2017-05-12 PROCEDURE — 99282 EMERGENCY DEPT VISIT SF MDM: CPT

## 2017-05-12 PROCEDURE — 81002 URINALYSIS NONAUTO W/O SCOPE: CPT

## 2017-05-13 NOTE — PROGRESS NOTES
edc = 21.3 Hx c/s x1    1835- patient presents to [facility] with c/o back pain on both sides. Patient states she has had a asymptomatic UTI for the past 3-4 weeks. Yesterday at Dr Joseph office was told it has gotten worse. Patient was started on Keflex on . Patient states today at around 1330 the pain in her back got worse. States it is more intense on her left side. She is also having a pain on her lower left abdomen. When she moves a certain way. Doppler FHT's 140. TOCO applied. Patient denies any fevers or chills. Patient has CVA tenderness on both left and right.     - urine dipped. No leukocytes and no nitrites.

## 2017-05-13 NOTE — ED PROVIDER NOTES
ED Provider Note    HPI: Patient is a 26-year-old female who presented to the emergency department May 12, 2017 at 5:17 PM with the chief complaint of crampy low abdominal pain and back pain.    Patient states she was diagnosed with UTI 3 weeks ago and started on Keflex. She was told to come to the emergency department she did not improve. The patient is developed crampy lower abdominal pain and low back pain. The patient is documented to be 22 weeks pregnant. She's had no vaginal bleeding or discharge. No fever. No other somatic complaints.    Review of Systems: Positive for crampy low abdominal pain and low back pain negative for vaginal discharge fever    Past medical/surgical history: Patient is 22 weeks pregnant    Medications: Albuterol Wellbutrin BuSpar Prilosec    Allergies: None    Social History: Previous history of smoking or alcohol use      Physical exam: Constitutional: Pleasant female awake and alert  Vital signs: Temperature 97.4 pulse 68 respirations 14 blood pressure 115/64  Musculoskeletal:  no  pain with palpitation or movement of muscle, bone or joint , no obvious musculoskeletal deformities identified.  Neurologic: alert and awake answers questions appropriately. Moves all four extremities independently, no gross focal abnormalities identified. Normal strength and motor.  Skin: no rash or lesion seen, no palpable dermatologic lesions identified.  Psychiatric: not anxious, delusional, or hallucinating.    Medical decision making: Patient sent immediately to L&D per protocol for 22 week pregnant woman with crampy abdominal pain. Once patient is cleared by L&D she will return here if needed

## 2017-05-13 NOTE — ED NOTES
C/o low back pain, is 22 weeks pregnant, was dxed and treated for bladder infection 3 weeks ago started on Keflex for same, told by Dr Joseph to come to ed if sx got worse, denies dysuria, but did not have w initial infection.

## 2017-05-13 NOTE — PROGRESS NOTES
1910- Received report from KAMALJIT Aparicio.  Assumed care.  1920- Abdomen palpates soft, no UC's noted on monitor, pt talking/smiling with RN.  Report to Dr. Joseph via phone.  Orders to discharge pt home with instruction to increase fluid intake and continue keflex.  Discussed hydration, keflex, pain management such as tylenol and hot compress for back pain, as well as to return if symptoms worsen, pt verbalizes understanding.  Pt requesting wheelchair for discharge due to back pain.  1949- Pt discharged home via wheelchair escorted by tech to her car.

## 2017-06-30 ENCOUNTER — HOSPITAL ENCOUNTER (OUTPATIENT)
Dept: LAB | Facility: MEDICAL CENTER | Age: 27
End: 2017-06-30
Attending: SPECIALIST
Payer: MEDICAID

## 2017-06-30 LAB
GLUCOSE 1H P 50 G GLC PO SERPL-MCNC: 116 MG/DL (ref 70–139)
HCT VFR BLD AUTO: 34 % (ref 37–47)
HGB BLD-MCNC: 10.9 G/DL (ref 12–16)

## 2017-06-30 PROCEDURE — 82950 GLUCOSE TEST: CPT

## 2017-06-30 PROCEDURE — 85014 HEMATOCRIT: CPT

## 2017-06-30 PROCEDURE — 36415 COLL VENOUS BLD VENIPUNCTURE: CPT

## 2017-06-30 PROCEDURE — 85018 HEMOGLOBIN: CPT

## 2017-07-13 ENCOUNTER — HOSPITAL ENCOUNTER (OUTPATIENT)
Facility: MEDICAL CENTER | Age: 27
End: 2017-07-13
Attending: SPECIALIST | Admitting: SPECIALIST
Payer: MEDICAID

## 2017-07-13 ENCOUNTER — HOSPITAL ENCOUNTER (EMERGENCY)
Facility: MEDICAL CENTER | Age: 27
End: 2017-07-13
Payer: MEDICAID

## 2017-07-13 VITALS
SYSTOLIC BLOOD PRESSURE: 103 MMHG | RESPIRATION RATE: 16 BRPM | TEMPERATURE: 97.5 F | WEIGHT: 169 LBS | DIASTOLIC BLOOD PRESSURE: 55 MMHG | HEART RATE: 58 BPM | BODY MASS INDEX: 31.1 KG/M2 | HEIGHT: 62 IN

## 2017-07-13 VITALS
OXYGEN SATURATION: 99 % | WEIGHT: 169.97 LBS | SYSTOLIC BLOOD PRESSURE: 115 MMHG | DIASTOLIC BLOOD PRESSURE: 60 MMHG | RESPIRATION RATE: 16 BRPM | TEMPERATURE: 97.2 F | BODY MASS INDEX: 31.08 KG/M2 | HEART RATE: 56 BPM

## 2017-07-13 LAB
APPEARANCE UR: CLEAR
COLOR UR AUTO: YELLOW
GLUCOSE UR QL STRIP.AUTO: NEGATIVE MG/DL
KETONES UR QL STRIP.AUTO: NEGATIVE MG/DL
LEUKOCYTE ESTERASE UR QL STRIP.AUTO: ABNORMAL
NITRITE UR QL STRIP.AUTO: NEGATIVE
PH UR STRIP.AUTO: 7 [PH]
PROT UR QL STRIP: NEGATIVE MG/DL
RBC UR QL AUTO: NEGATIVE
SP GR UR: 1.02

## 2017-07-13 PROCEDURE — 81002 URINALYSIS NONAUTO W/O SCOPE: CPT

## 2017-07-13 PROCEDURE — 302449 STATCHG TRIAGE ONLY (STATISTIC)

## 2017-07-13 PROCEDURE — 59025 FETAL NON-STRESS TEST: CPT | Performed by: SPECIALIST

## 2017-07-13 NOTE — PROGRESS NOTES
25yo  edc , 30.2 presents with c/o of left sided pain that started this morning (8/10) and dizziness that she noticed this morning around 7am. Denies LOF or vag bleeding. Pos fm. Pt was at the [facility] park on . She said that she had spotting on Monday. Pt was cramping on Tues and Wed. EFm and [place] placed. VSS. Pt has a hx of c/s x 1.   1200 UA obtained and dipped, see results.   1215 Message left for Dr. Kessler, OB on call for Dr. Joseph.   1315 Dr. Kessler updated. Okay to discharge to home.   1325 Discharge instructions given, Pt states understanding. Pt discharged to home in stable condition, ambulatory.

## 2017-08-10 ENCOUNTER — HOSPITAL ENCOUNTER (OUTPATIENT)
Facility: MEDICAL CENTER | Age: 27
End: 2017-08-10
Attending: SPECIALIST | Admitting: SPECIALIST
Payer: MEDICAID

## 2017-08-10 LAB — CRYSTALS AMN MICRO: NORMAL

## 2017-08-10 PROCEDURE — 59025 FETAL NON-STRESS TEST: CPT | Performed by: SPECIALIST

## 2017-08-10 PROCEDURE — 89060 EXAM SYNOVIAL FLUID CRYSTALS: CPT

## 2017-08-11 NOTE — PROGRESS NOTES
1900 Report received from Cynthia ZAVALA RN, POC discussed    1930 Dr. Kessler updated on pt status and fern (-) results. PT having mild UC, SVE closed/thick. Orders for discharge received.     1945 PT discharged home with pre-term labor precautions, pt verbalized understanding and off floor in stable condition.

## 2017-08-11 NOTE — PROGRESS NOTES
Patient came into triage because she is having pressure.she is a G4 P-3 with EDC of 9-19-17 which makes her 34.2weeks.she has had 1 vaginal delivery and two C/S.she thinks she might be leaking fluid.spec exam done-no pooling seen.Dr Kessler informed.1845 Dr Kessler into see patient.waiting for fern results.

## 2017-08-23 RX ORDER — PNV NO.95/FERROUS FUM/FOLIC AC 28MG-0.8MG
TABLET ORAL 2 TIMES DAILY
COMMUNITY
End: 2018-07-22

## 2017-09-14 ENCOUNTER — HOSPITAL ENCOUNTER (INPATIENT)
Facility: MEDICAL CENTER | Age: 27
LOS: 3 days | End: 2017-09-17
Attending: SPECIALIST | Admitting: SPECIALIST
Payer: MEDICAID

## 2017-09-14 LAB
BASOPHILS # BLD AUTO: 0.4 % (ref 0–1.8)
BASOPHILS # BLD: 0.04 K/UL (ref 0–0.12)
EOSINOPHIL # BLD AUTO: 0.04 K/UL (ref 0–0.51)
EOSINOPHIL NFR BLD: 0.4 % (ref 0–6.9)
ERYTHROCYTE [DISTWIDTH] IN BLOOD BY AUTOMATED COUNT: 45.7 FL (ref 35.9–50)
ERYTHROCYTE [DISTWIDTH] IN BLOOD BY AUTOMATED COUNT: 45.8 FL (ref 35.9–50)
HCT VFR BLD AUTO: 31.7 % (ref 37–47)
HCT VFR BLD AUTO: 36 % (ref 37–47)
HGB BLD-MCNC: 10.9 G/DL (ref 12–16)
HGB BLD-MCNC: 12.2 G/DL (ref 12–16)
HOLDING TUBE BB 8507: NORMAL
IMM GRANULOCYTES # BLD AUTO: 0.08 K/UL (ref 0–0.11)
IMM GRANULOCYTES NFR BLD AUTO: 0.8 % (ref 0–0.9)
LYMPHOCYTES # BLD AUTO: 1.84 K/UL (ref 1–4.8)
LYMPHOCYTES NFR BLD: 18.4 % (ref 22–41)
MCH RBC QN AUTO: 29.7 PG (ref 27–33)
MCH RBC QN AUTO: 30.4 PG (ref 27–33)
MCHC RBC AUTO-ENTMCNC: 33.9 G/DL (ref 33.6–35)
MCHC RBC AUTO-ENTMCNC: 34.4 G/DL (ref 33.6–35)
MCV RBC AUTO: 87.6 FL (ref 81.4–97.8)
MCV RBC AUTO: 88.3 FL (ref 81.4–97.8)
MONOCYTES # BLD AUTO: 0.7 K/UL (ref 0–0.85)
MONOCYTES NFR BLD AUTO: 7 % (ref 0–13.4)
NEUTROPHILS # BLD AUTO: 7.29 K/UL (ref 2–7.15)
NEUTROPHILS NFR BLD: 73 % (ref 44–72)
NRBC # BLD AUTO: 0 K/UL
NRBC BLD AUTO-RTO: 0 /100 WBC
PLATELET # BLD AUTO: 158 K/UL (ref 164–446)
PLATELET # BLD AUTO: 184 K/UL (ref 164–446)
PMV BLD AUTO: 11.3 FL (ref 9–12.9)
PMV BLD AUTO: 11.4 FL (ref 9–12.9)
RBC # BLD AUTO: 3.59 M/UL (ref 4.2–5.4)
RBC # BLD AUTO: 4.11 M/UL (ref 4.2–5.4)
WBC # BLD AUTO: 10 K/UL (ref 4.8–10.8)
WBC # BLD AUTO: 17.5 K/UL (ref 4.8–10.8)

## 2017-09-14 PROCEDURE — 700111 HCHG RX REV CODE 636 W/ 250 OVERRIDE (IP)

## 2017-09-14 PROCEDURE — 700105 HCHG RX REV CODE 258: Performed by: ANESTHESIOLOGY

## 2017-09-14 PROCEDURE — 700102 HCHG RX REV CODE 250 W/ 637 OVERRIDE(OP): Performed by: ANESTHESIOLOGY

## 2017-09-14 PROCEDURE — A9270 NON-COVERED ITEM OR SERVICE: HCPCS | Performed by: SPECIALIST

## 2017-09-14 PROCEDURE — 4A1HXCZ MONITORING OF PRODUCTS OF CONCEPTION, CARDIAC RATE, EXTERNAL APPROACH: ICD-10-PCS | Performed by: SPECIALIST

## 2017-09-14 PROCEDURE — 36415 COLL VENOUS BLD VENIPUNCTURE: CPT

## 2017-09-14 PROCEDURE — 700112 HCHG RX REV CODE 229: Performed by: SPECIALIST

## 2017-09-14 PROCEDURE — 85025 COMPLETE CBC W/AUTO DIFF WBC: CPT

## 2017-09-14 PROCEDURE — 85027 COMPLETE CBC AUTOMATED: CPT

## 2017-09-14 PROCEDURE — 700111 HCHG RX REV CODE 636 W/ 250 OVERRIDE (IP): Performed by: ANESTHESIOLOGY

## 2017-09-14 PROCEDURE — 700102 HCHG RX REV CODE 250 W/ 637 OVERRIDE(OP)

## 2017-09-14 PROCEDURE — 304964 HCHG RECOVERY ROOM TIME 1HR

## 2017-09-14 PROCEDURE — 305385 HCHG SURGICAL SERVICES 1/4 HOUR

## 2017-09-14 PROCEDURE — A9270 NON-COVERED ITEM OR SERVICE: HCPCS | Performed by: ANESTHESIOLOGY

## 2017-09-14 PROCEDURE — 700102 HCHG RX REV CODE 250 W/ 637 OVERRIDE(OP): Performed by: SPECIALIST

## 2017-09-14 PROCEDURE — 306828 HCHG ANES-TIME GENERAL: Performed by: SPECIALIST

## 2017-09-14 PROCEDURE — 59514 CESAREAN DELIVERY ONLY: CPT

## 2017-09-14 PROCEDURE — 770002 HCHG ROOM/CARE - OB PRIVATE (112)

## 2017-09-14 RX ORDER — DOCUSATE SODIUM 100 MG/1
100 CAPSULE, LIQUID FILLED ORAL 2 TIMES DAILY PRN
Status: DISCONTINUED | OUTPATIENT
Start: 2017-09-14 | End: 2017-09-17 | Stop reason: HOSPADM

## 2017-09-14 RX ORDER — DIPHENHYDRAMINE HYDROCHLORIDE 50 MG/ML
12.5 INJECTION INTRAMUSCULAR; INTRAVENOUS EVERY 6 HOURS PRN
Status: DISCONTINUED | OUTPATIENT
Start: 2017-09-14 | End: 2017-09-15

## 2017-09-14 RX ORDER — SODIUM CHLORIDE, SODIUM LACTATE, POTASSIUM CHLORIDE, CALCIUM CHLORIDE 600; 310; 30; 20 MG/100ML; MG/100ML; MG/100ML; MG/100ML
1500 INJECTION, SOLUTION INTRAVENOUS ONCE
Status: COMPLETED | OUTPATIENT
Start: 2017-09-14 | End: 2017-09-14

## 2017-09-14 RX ORDER — KETOROLAC TROMETHAMINE 30 MG/ML
30 INJECTION, SOLUTION INTRAMUSCULAR; INTRAVENOUS EVERY 6 HOURS
Status: DISCONTINUED | OUTPATIENT
Start: 2017-09-14 | End: 2017-09-15

## 2017-09-14 RX ORDER — ONDANSETRON 2 MG/ML
4 INJECTION INTRAMUSCULAR; INTRAVENOUS EVERY 6 HOURS PRN
Status: DISCONTINUED | OUTPATIENT
Start: 2017-09-14 | End: 2017-09-15

## 2017-09-14 RX ORDER — VITAMIN A ACETATE, BETA CAROTENE, ASCORBIC ACID, CHOLECALCIFEROL, .ALPHA.-TOCOPHEROL ACETATE, DL-, THIAMINE MONONITRATE, RIBOFLAVIN, NIACINAMIDE, PYRIDOXINE HYDROCHLORIDE, FOLIC ACID, CYANOCOBALAMIN, CALCIUM CARBONATE, FERROUS FUMARATE, ZINC OXIDE, CUPRIC OXIDE 3080; 12; 120; 400; 1; 1.84; 3; 20; 22; 920; 25; 200; 27; 10; 2 [IU]/1; UG/1; MG/1; [IU]/1; MG/1; MG/1; MG/1; MG/1; MG/1; [IU]/1; MG/1; MG/1; MG/1; MG/1; MG/1
1 TABLET, FILM COATED ORAL EVERY MORNING
Status: DISCONTINUED | OUTPATIENT
Start: 2017-09-14 | End: 2017-09-17 | Stop reason: HOSPADM

## 2017-09-14 RX ORDER — METOCLOPRAMIDE HYDROCHLORIDE 5 MG/ML
10 INJECTION INTRAMUSCULAR; INTRAVENOUS EVERY 6 HOURS PRN
Status: DISCONTINUED | OUTPATIENT
Start: 2017-09-14 | End: 2017-09-15

## 2017-09-14 RX ORDER — SIMETHICONE 80 MG
80 TABLET,CHEWABLE ORAL 4 TIMES DAILY PRN
Status: DISCONTINUED | OUTPATIENT
Start: 2017-09-14 | End: 2017-09-17 | Stop reason: HOSPADM

## 2017-09-14 RX ORDER — ACETAMINOPHEN 325 MG/1
325 TABLET ORAL EVERY 4 HOURS PRN
Status: DISCONTINUED | OUTPATIENT
Start: 2017-09-14 | End: 2017-09-17 | Stop reason: HOSPADM

## 2017-09-14 RX ORDER — BISACODYL 10 MG
10 SUPPOSITORY, RECTAL RECTAL PRN
Status: DISCONTINUED | OUTPATIENT
Start: 2017-09-14 | End: 2017-09-17 | Stop reason: HOSPADM

## 2017-09-14 RX ORDER — SODIUM CHLORIDE, SODIUM LACTATE, POTASSIUM CHLORIDE, CALCIUM CHLORIDE 600; 310; 30; 20 MG/100ML; MG/100ML; MG/100ML; MG/100ML
INJECTION, SOLUTION INTRAVENOUS PRN
Status: DISCONTINUED | OUTPATIENT
Start: 2017-09-14 | End: 2017-09-17 | Stop reason: HOSPADM

## 2017-09-14 RX ORDER — OXYCODONE HYDROCHLORIDE AND ACETAMINOPHEN 5; 325 MG/1; MG/1
1 TABLET ORAL EVERY 4 HOURS PRN
Status: DISPENSED | OUTPATIENT
Start: 2017-09-14 | End: 2017-09-15

## 2017-09-14 RX ORDER — CITRIC ACID/SODIUM CITRATE 334-500MG
SOLUTION, ORAL ORAL
Status: COMPLETED
Start: 2017-09-14 | End: 2017-09-14

## 2017-09-14 RX ORDER — METOCLOPRAMIDE HYDROCHLORIDE 5 MG/ML
10 INJECTION INTRAMUSCULAR; INTRAVENOUS ONCE
Status: COMPLETED | OUTPATIENT
Start: 2017-09-14 | End: 2017-09-14

## 2017-09-14 RX ORDER — OXYCODONE AND ACETAMINOPHEN 10; 325 MG/1; MG/1
1 TABLET ORAL EVERY 4 HOURS PRN
Status: DISPENSED | OUTPATIENT
Start: 2017-09-14 | End: 2017-09-15

## 2017-09-14 RX ORDER — MISOPROSTOL 200 UG/1
600 TABLET ORAL
Status: DISCONTINUED | OUTPATIENT
Start: 2017-09-14 | End: 2017-09-17 | Stop reason: HOSPADM

## 2017-09-14 RX ORDER — CITRIC ACID/SODIUM CITRATE 334-500MG
30 SOLUTION, ORAL ORAL ONCE
Status: COMPLETED | OUTPATIENT
Start: 2017-09-14 | End: 2017-09-14

## 2017-09-14 RX ADMIN — OXYCODONE HYDROCHLORIDE AND ACETAMINOPHEN 1 TABLET: 5; 325 TABLET ORAL at 15:16

## 2017-09-14 RX ADMIN — Medication 1 TABLET: at 15:16

## 2017-09-14 RX ADMIN — KETOROLAC TROMETHAMINE 30 MG: 30 INJECTION, SOLUTION INTRAMUSCULAR at 18:43

## 2017-09-14 RX ADMIN — SODIUM CHLORIDE, POTASSIUM CHLORIDE, SODIUM LACTATE AND CALCIUM CHLORIDE 1500 ML: 600; 310; 30; 20 INJECTION, SOLUTION INTRAVENOUS at 11:27

## 2017-09-14 RX ADMIN — FAMOTIDINE 20 MG: 10 INJECTION, SOLUTION INTRAVENOUS at 11:26

## 2017-09-14 RX ADMIN — OXYTOCIN 0.02 UNITS: 10 INJECTION, SOLUTION INTRAMUSCULAR; INTRAVENOUS at 14:04

## 2017-09-14 RX ADMIN — SODIUM CITRATE AND CITRIC ACID MONOHYDRATE 30 ML: 500; 334 SOLUTION ORAL at 11:20

## 2017-09-14 RX ADMIN — DOCUSATE SODIUM 100 MG: 100 CAPSULE ORAL at 15:16

## 2017-09-14 RX ADMIN — Medication 30 ML: at 11:20

## 2017-09-14 RX ADMIN — OXYCODONE HYDROCHLORIDE AND ACETAMINOPHEN 1 TABLET: 10; 325 TABLET ORAL at 19:55

## 2017-09-14 RX ADMIN — METOCLOPRAMIDE 10 MG: 5 INJECTION, SOLUTION INTRAMUSCULAR; INTRAVENOUS at 11:27

## 2017-09-14 ASSESSMENT — LIFESTYLE VARIABLES
ALCOHOL_USE: NO
DO YOU DRINK ALCOHOL: NO
EVER_SMOKED: YES
PACK_YEARS: 10 YEARS
DO YOU DRINK ALCOHOL: NO

## 2017-09-14 ASSESSMENT — PATIENT HEALTH QUESTIONNAIRE - PHQ9
SUM OF ALL RESPONSES TO PHQ QUESTIONS 1-9: 0
2. FEELING DOWN, DEPRESSED, IRRITABLE, OR HOPELESS: NOT AT ALL
1. LITTLE INTEREST OR PLEASURE IN DOING THINGS: NOT AT ALL
SUM OF ALL RESPONSES TO PHQ9 QUESTIONS 1 AND 2: 0

## 2017-09-14 ASSESSMENT — PAIN SCALES - GENERAL
PAINLEVEL_OUTOF10: 7
PAINLEVEL_OUTOF10: 4
PAINLEVEL_OUTOF10: 0
PAINLEVEL_OUTOF10: 0
PAINLEVEL_OUTOF10: 2
PAINLEVEL_OUTOF10: 3
PAINLEVEL_OUTOF10: 2
PAINLEVEL_OUTOF10: 2
PAINLEVEL_OUTOF10: 0

## 2017-09-14 ASSESSMENT — COPD QUESTIONNAIRES
DO YOU EVER COUGH UP ANY MUCUS OR PHLEGM?: NO/ONLY WITH OCCASIONAL COLDS OR INFECTIONS
HAVE YOU SMOKED AT LEAST 100 CIGARETTES IN YOUR ENTIRE LIFE: NO/DON'T KNOW
DURING THE PAST 4 WEEKS HOW MUCH DID YOU FEEL SHORT OF BREATH: NONE/LITTLE OF THE TIME
COPD SCREENING SCORE: 0

## 2017-09-14 NOTE — PROGRESS NOTES
Pt arrived via [facility] with belongings to room S323. Patient transferred to hospital bed without difficulty. Girard catheter in place draining to gravity. Bilateral SCDs in place. Report received from KAMALJIT Leroy. Pt oriented to room, call light, emergency light, skylight, & infant security. Assessment done. Fundus firm, lochia light. Vital signs stable. IV infusing 125 of pit in R FA. Pt denies pain at this time. Pt aware of dangers related to sleeping with infant. Educated parents on safe sleep and infant sleep sack. Unit sleep sack provided to parents to use at this time. Reviewed plan of care with pt. Call light in place. Will continue to monitor

## 2017-09-14 NOTE — PROGRESS NOTES
Patient admitted to [facility] at 1002 for repeat C/S 39.2 , placed on monitors and admitted.  Received report from WALTER Whipple RN at 1105, patient with no complaints of vaginal bleeding or leaking of fluid, no pain.  1110 VSS  1206 patient taken back to OR1  1231 Viable baby girl delivered 8/9 apgars 3255g  1256 pt transferred to PACU bed, stable and in no pain  Report given to RICHARD Sarah RN in postpartum

## 2017-09-14 NOTE — OP REPORT
DATE OF SERVICE:  2017     PREOPERATIVE DIAGNOSES:  Intrauterine pregnancy at 39 2/7 weeks gestation, previous  section, desires repeat low transverse  section     POSTOPERATIVE DIAGNOSES: Same; delivered     PROCEDURE PERFORMED:  Repeat low transverse  section.     SURGEON:  Thor Joseph MD     ASSISTANT:  Dayton Bermudez MD.     ANESTHESIA: Spinal     ANESTHESIOLOGIST:  Dr. Conner     ESTIMATED BLOOD LOSS FOR THE PROCEDURE: 500 mL.     FINDINGS:  Normal uterus, tubes, ovaries.  Vigorous  female infant with Apgars of 8 and 9  at 1 and 5 minutes respectively.  Placenta intact with 3-vessel cord.     DESCRIPTION OF PROCEDURE:  The patient was taken to the operating room where    spinal anesthetic was placed without difficulty.  The patient was then prepped  and draped in the usual sterile fashion.  A Pfannenstiel skin incision was    made and carried down sharply through the previous skin incision down to the    level of the rectus fascia.  Rectus fascia was nicked in the midline.  Fascial   incision was extended laterally in both directions with the use of Way    scissors.  Kocher clamps were placed on the inferior and superior edge of the    rectus fascia, which was used to dissect away from the underlying rectus    muscles.  Rectus muscles were  in the midline.  Peritoneum was    entered sharply.  Peritoneal incision was extended superior and inferior    carefully avoiding the bladder.  Bladder blade was placed.  Vesicouterine    peritoneum was grasped, incised, the bladder flap was created.  Uterine    incision was made with the use of scalpel and extended laterally in both    directions.  Clear amniotic fluid was noted upon entry into the uterine    cavity.  With gentle fundal pressure, the infant's head was delivered.  A    loose nuchal cord was reduced followed by easy delivery of the shoulders and    body.  Cord was clamped and cut.  Infant was handed to waiting nursing  staff.    Apgars were 8 and 9 at 1 and 5 minutes respectively.  Placenta was then  delivered intact with 3-vessel cord.  The uterus was then brought into maternal abdomen, wrapped in a wet lap sponge, dry gauze curetted.  Uterine incision was reapproximated with #1 chromic in a running interlocking fashion    in one segment.  Excellent hemostasis was appreciated.  The uterus was then    returned back to the maternal abdomen.  Gutters were freed of clots.  Uterine    incision and subfascial space were noted to be hemostatic.  Peritoneum was    then reapproximated with 2-0 Vicryl running fashion in one segment.  The    fascia was then reapproximated with 0 Vicryl in running fashion in one    segment.  Subcutaneous tissue was irrigated.  Hemostasis was achieved with the   use of electrocautery.  Skin was closed with a 4-0 subcuticular stitch.     Patient tolerated the procedure well and was taken to the recovery in stable    condition.        ____________________________________     EVE DUFFY MD

## 2017-09-14 NOTE — CARE PLAN
Problem: Altered physiologic condition related to postoperative  delivery  Goal: Patient physiologically stable as evidenced by normal lochia, palpable uterine involution and vital signs within normal limits  Outcome: PROGRESSING AS EXPECTED  Fundus is firm, lochia is light and vital signs are stable. Will continue to monitor with Q4 hour checks and hourly rounding    Problem: Potential for postpartum infection related to surgical incision, compromised uterine condition, urinary tract or respiratory compromise  Goal: Patient will be afebrile and free from signs and symptoms of infection  Outcome: PROGRESSING AS EXPECTED  Patient is afebrile and does not exhibit any signs/symptoms of infection. Will continue to monitor with Q6 hour checks and hourly rounding

## 2017-09-14 NOTE — OR SURGEON
Operative Report    PreOp Diagnosis: Intrauterine pregnancy at 39 2/7 weeks gestation, previous  section, desires repeat low transverse  section    PostOp Diagnosis: Same; delivered    Procedure(s):  REPEAT C SECTION    Surgeon(s):  BROOKS Sawant M.D.    Anesthesiologist/Type of Anesthesia:  Spinal/ Dr. Conner    Surgical Staff:  Circulator: (Unknown)  Scrub Person: (Unknown)    Specimens:  None    Estimated Blood Loss: 500ccs    Findings: Normal uterus tubes and ovaries, vigorous infant with Apgars of 8/9 at one and five min respectively, placenta delivered intact with 3 vessel cord    Complications: None        2017 12:54 PM Thor Joseph

## 2017-09-15 PROCEDURE — 700102 HCHG RX REV CODE 250 W/ 637 OVERRIDE(OP): Performed by: ANESTHESIOLOGY

## 2017-09-15 PROCEDURE — 700111 HCHG RX REV CODE 636 W/ 250 OVERRIDE (IP): Performed by: ANESTHESIOLOGY

## 2017-09-15 PROCEDURE — 770002 HCHG ROOM/CARE - OB PRIVATE (112)

## 2017-09-15 PROCEDURE — A9270 NON-COVERED ITEM OR SERVICE: HCPCS | Performed by: ANESTHESIOLOGY

## 2017-09-15 PROCEDURE — A9270 NON-COVERED ITEM OR SERVICE: HCPCS | Performed by: SPECIALIST

## 2017-09-15 PROCEDURE — 700102 HCHG RX REV CODE 250 W/ 637 OVERRIDE(OP): Performed by: SPECIALIST

## 2017-09-15 RX ORDER — ONDANSETRON 4 MG/1
4 TABLET, ORALLY DISINTEGRATING ORAL EVERY 6 HOURS PRN
Status: DISCONTINUED | OUTPATIENT
Start: 2017-09-15 | End: 2017-09-17 | Stop reason: HOSPADM

## 2017-09-15 RX ORDER — IBUPROFEN 600 MG/1
600 TABLET ORAL EVERY 6 HOURS PRN
Status: DISCONTINUED | OUTPATIENT
Start: 2017-09-16 | End: 2017-09-15

## 2017-09-15 RX ORDER — OXYCODONE HYDROCHLORIDE AND ACETAMINOPHEN 5; 325 MG/1; MG/1
2 TABLET ORAL EVERY 4 HOURS PRN
Status: DISCONTINUED | OUTPATIENT
Start: 2017-09-15 | End: 2017-09-17 | Stop reason: HOSPADM

## 2017-09-15 RX ORDER — KETOROLAC TROMETHAMINE 30 MG/ML
30 INJECTION, SOLUTION INTRAMUSCULAR; INTRAVENOUS EVERY 6 HOURS
Status: DISCONTINUED | OUTPATIENT
Start: 2017-09-15 | End: 2017-09-15

## 2017-09-15 RX ORDER — DIPHENHYDRAMINE HYDROCHLORIDE 50 MG/ML
25 INJECTION INTRAMUSCULAR; INTRAVENOUS EVERY 6 HOURS PRN
Status: DISCONTINUED | OUTPATIENT
Start: 2017-09-15 | End: 2017-09-17 | Stop reason: HOSPADM

## 2017-09-15 RX ORDER — DIPHENHYDRAMINE HCL 25 MG
25 TABLET ORAL EVERY 6 HOURS PRN
Status: DISCONTINUED | OUTPATIENT
Start: 2017-09-15 | End: 2017-09-17 | Stop reason: HOSPADM

## 2017-09-15 RX ORDER — ONDANSETRON 2 MG/ML
4 INJECTION INTRAMUSCULAR; INTRAVENOUS EVERY 6 HOURS PRN
Status: DISCONTINUED | OUTPATIENT
Start: 2017-09-15 | End: 2017-09-17 | Stop reason: HOSPADM

## 2017-09-15 RX ORDER — METOCLOPRAMIDE HYDROCHLORIDE 5 MG/ML
10 INJECTION INTRAMUSCULAR; INTRAVENOUS EVERY 6 HOURS PRN
Status: DISCONTINUED | OUTPATIENT
Start: 2017-09-15 | End: 2017-09-17 | Stop reason: HOSPADM

## 2017-09-15 RX ORDER — HYDROCODONE BITARTRATE AND ACETAMINOPHEN 5; 325 MG/1; MG/1
1 TABLET ORAL EVERY 4 HOURS PRN
Status: DISCONTINUED | OUTPATIENT
Start: 2017-09-15 | End: 2017-09-17 | Stop reason: HOSPADM

## 2017-09-15 RX ORDER — MORPHINE SULFATE 4 MG/ML
4 INJECTION, SOLUTION INTRAMUSCULAR; INTRAVENOUS
Status: DISCONTINUED | OUTPATIENT
Start: 2017-09-15 | End: 2017-09-17 | Stop reason: HOSPADM

## 2017-09-15 RX ORDER — IBUPROFEN 600 MG/1
600 TABLET ORAL EVERY 6 HOURS PRN
Status: DISCONTINUED | OUTPATIENT
Start: 2017-09-15 | End: 2017-09-17 | Stop reason: HOSPADM

## 2017-09-15 RX ADMIN — OXYCODONE HYDROCHLORIDE AND ACETAMINOPHEN 1 TABLET: 5; 325 TABLET ORAL at 00:05

## 2017-09-15 RX ADMIN — IBUPROFEN 600 MG: 600 TABLET, FILM COATED ORAL at 18:15

## 2017-09-15 RX ADMIN — OXYCODONE HYDROCHLORIDE AND ACETAMINOPHEN 1 TABLET: 10; 325 TABLET ORAL at 12:44

## 2017-09-15 RX ADMIN — HYDROCODONE BITARTRATE AND ACETAMINOPHEN 1 TABLET: 5; 325 TABLET ORAL at 16:15

## 2017-09-15 RX ADMIN — Medication 1 TABLET: at 08:29

## 2017-09-15 RX ADMIN — HYDROCODONE BITARTRATE AND ACETAMINOPHEN 1 TABLET: 5; 325 TABLET ORAL at 22:17

## 2017-09-15 RX ADMIN — OXYCODONE HYDROCHLORIDE AND ACETAMINOPHEN 1 TABLET: 5; 325 TABLET ORAL at 06:24

## 2017-09-15 RX ADMIN — KETOROLAC TROMETHAMINE 30 MG: 30 INJECTION, SOLUTION INTRAMUSCULAR at 00:06

## 2017-09-15 RX ADMIN — KETOROLAC TROMETHAMINE 30 MG: 30 INJECTION, SOLUTION INTRAMUSCULAR at 12:40

## 2017-09-15 ASSESSMENT — PAIN SCALES - GENERAL
PAINLEVEL_OUTOF10: 5
PAINLEVEL_OUTOF10: 3
PAINLEVEL_OUTOF10: 2
PAINLEVEL_OUTOF10: 1
PAINLEVEL_OUTOF10: 4
PAINLEVEL_OUTOF10: 5
PAINLEVEL_OUTOF10: 4
PAINLEVEL_OUTOF10: 2
PAINLEVEL_OUTOF10: 6

## 2017-09-15 NOTE — CARE PLAN
Problem: Communication  Goal: The ability to communicate needs accurately and effectively will improve  Outcome: PROGRESSING AS EXPECTED  Patient ambulated to bathroom well, taking adequate PO fluids and making urine output. D/C chew. All questions and concerns answered.     Problem: Pain Management  Goal: Pain level will decrease to patient's comfort goal  Outcome: PROGRESSING AS EXPECTED  Patient would like to keep PRN pain medication as scheduled.

## 2017-09-15 NOTE — PROGRESS NOTES
Progress Note    Subjective:   Doing well. No issues or concerns. Pain well controlled. No sig bleeding or discharge.    Objective Data:  Recent Labs      09/14/17   1045  09/14/17   2203   WBC  10.0  17.5*   RBC  4.11*  3.59*   HEMOGLOBIN  12.2  10.9*   HEMATOCRIT  36.0*  31.7*   MCV  87.6  88.3   MCH  29.7  30.4   MCHC  33.9  34.4   RDW  45.7  45.8   PLATELETCT  184  158*   MPV  11.3  11.4           Vitals:    09/15/17 0100 09/15/17 0200 09/15/17 0300 09/15/17 0400   BP:    104/59   Pulse: 62 77 69 77   Resp: 16 17 17 16   Temp:    36.8 °C (98.3 °F)   SpO2: 94% 95% 96% 97%   Weight:       Height:         Abdomen: soft min ttp at covered incision  Perineum: no sig bleeding  Ext:N on tender calves    Intake/Output Summary (Last 24 hours) at 09/15/17 0755  Last data filed at 09/15/17 0030   Gross per 24 hour   Intake             4200 ml   Output             2085 ml   Net             2115 ml       Current Facility-Administered Medications   Medication Dose Route Frequency Provider Last Rate Last Dose   • LR infusion   Intravenous PRN Thor Joseph M.D.       • misoprostol (CYTOTEC) tablet 600 mcg  600 mcg Rectal Once PRN Thor Joseph M.D.       • docusate sodium (COLACE) capsule 100 mg  100 mg Oral BID PRN Thor Joseph M.D.   100 mg at 09/14/17 1516   • bisacodyl (DULCOLAX) suppository 10 mg  10 mg Rectal PRN Thor Joseph M.D.       • simethicone (MYLICON) chewable tab 80 mg  80 mg Oral 4X/DAY PRN Thor Joseph M.D.       • prenatal plus vitamin (STUARTNATAL 1+1) 27-1 MG tablet 1 Tab  1 Tab Oral QAM Thor Joseph M.D.   1 Tab at 09/14/17 1516   • acetaminophen (TYLENOL) tablet 325 mg  325 mg Oral Q4HRS PRN Thor Joseph M.D.       • ketorolac (TORADOL) injection 30 mg  30 mg Intravenous Q6HRS Ruben Conner M.D.   30 mg at 09/15/17 0006   • oxycodone-acetaminophen (PERCOCET) 5-325 MG per tablet 1 Tab  1 Tab Oral Q4HRS PRN Ruben Conner M.D.   1 Tab at 09/15/17 0624   • oxycodone-acetaminophen  (PERCOCET-10)  MG per tablet 1 Tab  1 Tab Oral Q4HRS PRROLANDO Conner M.D.   1 Tab at 09/14/17 1955   • HYDROmorphone (DILAUDID) injection 0.2 mg  0.2 mg Intravenous Q2HRS PRROLANDO Conner M.D.       • HYDROmorphone (DILAUDID) injection 0.4 mg  0.4 mg Intravenous Q2HRS PRROLANDO Conner M.D.       • ephedrine injection 10 mg  10 mg Intravenous Q5 MIN PRN Ruben Conner M.D.       • ondansetron (ZOFRAN) syringe/vial injection 4 mg  4 mg Intravenous Q6HRS PRISCA Conner M.D.       • metoclopramide (REGLAN) injection 10 mg  10 mg Intravenous Q6HRS PRN Ruben Conner M.D.       • diphenhydrAMINE (BENADRYL) injection 12.5 mg  12.5 mg Intravenous Q6HRS PRROLANDO Conner M.D.           A/P S/P Repeat LTCS. Doing well. No issues or concerns. Plan to proceed with the usual pp and post operative management.

## 2017-09-15 NOTE — CARE PLAN
Problem: Potential knowledge deficit related to lack of understanding of self and  care   Goal: Patient will demonstrate ability to care for self and infant   Intervention: Educate patient verbally, by demonstration, and with written and video material   Pt education provided and pt verbalizes/demonstrates understanding.     Problem: Alteration in comfort related to surgical incision and/or after birth pains   Goal: Patient is able to ambulate, care for self and infant with acceptable pain level   Intervention: Assess 0-10 pain level with vital signs   Pain level assessed q 4 hrs and prn using numerical scale.   Intervention: Administer pain meds as requested by patient and ordered by MD/DO/CNM   Pain medication administered per MAR as ordered by MD

## 2017-09-16 PROCEDURE — 700102 HCHG RX REV CODE 250 W/ 637 OVERRIDE(OP): Performed by: SPECIALIST

## 2017-09-16 PROCEDURE — A9270 NON-COVERED ITEM OR SERVICE: HCPCS | Performed by: SPECIALIST

## 2017-09-16 PROCEDURE — 700112 HCHG RX REV CODE 229: Performed by: SPECIALIST

## 2017-09-16 PROCEDURE — 770002 HCHG ROOM/CARE - OB PRIVATE (112)

## 2017-09-16 RX ADMIN — HYDROCODONE BITARTRATE AND ACETAMINOPHEN 1 TABLET: 5; 325 TABLET ORAL at 16:27

## 2017-09-16 RX ADMIN — IBUPROFEN 600 MG: 600 TABLET, FILM COATED ORAL at 14:31

## 2017-09-16 RX ADMIN — IBUPROFEN 600 MG: 600 TABLET, FILM COATED ORAL at 20:34

## 2017-09-16 RX ADMIN — Medication 1 TABLET: at 11:43

## 2017-09-16 RX ADMIN — IBUPROFEN 600 MG: 600 TABLET, FILM COATED ORAL at 06:51

## 2017-09-16 RX ADMIN — SIMETHICONE CHEW TAB 80 MG 80 MG: 80 TABLET ORAL at 14:31

## 2017-09-16 RX ADMIN — HYDROCODONE BITARTRATE AND ACETAMINOPHEN 1 TABLET: 5; 325 TABLET ORAL at 06:51

## 2017-09-16 RX ADMIN — HYDROCODONE BITARTRATE AND ACETAMINOPHEN 1 TABLET: 5; 325 TABLET ORAL at 11:43

## 2017-09-16 RX ADMIN — HYDROCODONE BITARTRATE AND ACETAMINOPHEN 1 TABLET: 5; 325 TABLET ORAL at 20:34

## 2017-09-16 RX ADMIN — DOCUSATE SODIUM 100 MG: 100 CAPSULE ORAL at 11:43

## 2017-09-16 RX ADMIN — IBUPROFEN 600 MG: 600 TABLET, FILM COATED ORAL at 00:38

## 2017-09-16 RX ADMIN — SIMETHICONE CHEW TAB 80 MG 80 MG: 80 TABLET ORAL at 20:34

## 2017-09-16 ASSESSMENT — PAIN SCALES - GENERAL
PAINLEVEL_OUTOF10: 6
PAINLEVEL_OUTOF10: 3
PAINLEVEL_OUTOF10: 2
PAINLEVEL_OUTOF10: 2
PAINLEVEL_OUTOF10: 4
PAINLEVEL_OUTOF10: 6
PAINLEVEL_OUTOF10: 2
PAINLEVEL_OUTOF10: 4
PAINLEVEL_OUTOF10: 7

## 2017-09-16 NOTE — CARE PLAN
Problem: Potential anxiety related to difficulty adapting to parental role  Goal: Patient will verbalize and demonstrate effective bonding and parenting behavior    Intervention: Assess patient for anxiety or apprehension regarding parenting role  Patient continues to breastfeed on demand. Patient asking for breastfeeding assistance to assess latch. Patient continues to us prn pain medication as needed. Fob in room for assistance. Patient plans to stay another day and discharge tomorrow.

## 2017-09-16 NOTE — PROGRESS NOTES
1200-assisted with and educated on proper positioning for deeper latch r/t patient c/o discomfort with breastfeeding and shallow latch, deeper latch achieved and mother reports increased comfort, educated again on importance of deep latch and damage caused by shallow latch, mother has [facility] and will seek outpatient assistance there, also aware of assistance available at [facility], encouraged to call for ongoing assistance as needed.

## 2017-09-16 NOTE — PROGRESS NOTES
Per CNA, patient stated that she put dirty sleep sack into the regular linen hamper and it was sent our hospital dirty linen.

## 2017-09-16 NOTE — PROGRESS NOTES
mepilex silver dressing to surgical incision to lower abdomen. No drainage noted. Pain medication effective for pain relief. Ambulates with steady gait. Voiding without difficulty

## 2017-09-16 NOTE — PROGRESS NOTES
Progress Note    Subjective:   Doing well. Pain well controlled. No sig bleeding or discharge. BF well    Objective Data:  Recent Labs      09/14/17   1045  09/14/17   2203   WBC  10.0  17.5*   RBC  4.11*  3.59*   HEMOGLOBIN  12.2  10.9*   HEMATOCRIT  36.0*  31.7*   MCV  87.6  88.3   MCH  29.7  30.4   MCHC  33.9  34.4   RDW  45.7  45.8   PLATELETCT  184  158*   MPV  11.3  11.4           Vitals:    09/15/17 0800 09/15/17 1200 09/15/17 2000 09/16/17 0800   BP: (!) 96/52 (!) 99/58 100/60 109/66   Pulse: 66 75 64 (!) 55   Resp: 16 16 (!) 24 20   Temp: 36.8 °C (98.3 °F) 36.8 °C (98.2 °F) 36.8 °C (98.2 °F) 36.8 °C (98.2 °F)   SpO2: 96% 97% 94% 97%   Weight:       Height:         Abdomen: soft non tender except min ttp at c/d/i incision  Perineum: no sig bleeding  Ext: non tender calves  No intake or output data in the 24 hours ending 09/16/17 1133    Current Facility-Administered Medications   Medication Dose Route Frequency Provider Last Rate Last Dose   • hydrocodone-acetaminophen (NORCO) 5-325 MG per tablet 1 Tab  1 Tab Oral Q4HRS PRN Thor Joseph M.D.   1 Tab at 09/16/17 0651   • oxycodone-acetaminophen (PERCOCET) 5-325 MG per tablet 2 Tab  2 Tab Oral Q4HRS PRN Thor Joseph M.D.       • morphine (pf) 4 mg/ml injection 4 mg  4 mg Intramuscular Q3HRS PRN Thor Joseph M.D.       • ondansetron (ZOFRAN) syringe/vial injection 4 mg  4 mg Intravenous Q6HRS PRN Thor Joseph M.D.        Or   • ondansetron (ZOFRAN ODT) dispertab 4 mg  4 mg Oral Q6HRS PRN Thor Joseph M.D.       • metoclopramide (REGLAN) injection 10 mg  10 mg Intravenous Q6HRS PRN Thor Joseph M.D.       • diphenhydrAMINE (BENADRYL) tablet/capsule 25 mg  25 mg Oral Q6HRS PRN Thor Joseph M.D.        Or   • diphenhydrAMINE (BENADRYL) injection 25 mg  25 mg Intravenous Q6HRS PRN Thor Joseph M.D.       • ibuprofen (MOTRIN) tablet 600 mg  600 mg Oral Q6HRS PRN Thor Joseph M.D.   600 mg at 09/16/17 0651   • LR infusion    Intravenous PRN Thor Joseph M.D.       • misoprostol (CYTOTEC) tablet 600 mcg  600 mcg Rectal Once PRN Thor Joseph M.D.       • docusate sodium (COLACE) capsule 100 mg  100 mg Oral BID PRN Thor Joseph M.D.   100 mg at 09/14/17 1516   • bisacodyl (DULCOLAX) suppository 10 mg  10 mg Rectal PRN Thor Joseph M.D.       • simethicone (MYLICON) chewable tab 80 mg  80 mg Oral 4X/DAY PRN Thor Joseph M.D.       • prenatal plus vitamin (STUARTNATAL 1+1) 27-1 MG tablet 1 Tab  1 Tab Oral QAM Thor Joseph M.D.   1 Tab at 09/15/17 0829   • acetaminophen (TYLENOL) tablet 325 mg  325 mg Oral Q4HRS PRN Thor Joseph M.D.           A/P S/P Repeat LTCS. Doing well. No issues or concerns. Plan to discharge home tomorrow as she has met discharge criteria but her  is working today and will be available to help her more tomorrow so she wishes to get this assistance tomorrow.

## 2017-09-17 VITALS
WEIGHT: 172 LBS | HEIGHT: 62 IN | OXYGEN SATURATION: 95 % | HEART RATE: 57 BPM | RESPIRATION RATE: 18 BRPM | SYSTOLIC BLOOD PRESSURE: 108 MMHG | BODY MASS INDEX: 31.65 KG/M2 | TEMPERATURE: 98.2 F | DIASTOLIC BLOOD PRESSURE: 71 MMHG

## 2017-09-17 PROCEDURE — A9270 NON-COVERED ITEM OR SERVICE: HCPCS | Performed by: SPECIALIST

## 2017-09-17 PROCEDURE — 700112 HCHG RX REV CODE 229: Performed by: SPECIALIST

## 2017-09-17 PROCEDURE — 700102 HCHG RX REV CODE 250 W/ 637 OVERRIDE(OP): Performed by: SPECIALIST

## 2017-09-17 RX ORDER — HYDROCODONE BITARTRATE AND ACETAMINOPHEN 5; 325 MG/1; MG/1
1 TABLET ORAL EVERY 4 HOURS PRN
Qty: 30 TAB | Refills: 0 | Status: SHIPPED | OUTPATIENT
Start: 2017-09-17 | End: 2018-07-22

## 2017-09-17 RX ORDER — IBUPROFEN 600 MG/1
600 TABLET ORAL EVERY 6 HOURS PRN
Qty: 30 TAB | Refills: 0 | Status: SHIPPED | OUTPATIENT
Start: 2017-09-17 | End: 2018-07-22

## 2017-09-17 RX ADMIN — DOCUSATE SODIUM 100 MG: 100 CAPSULE ORAL at 10:40

## 2017-09-17 RX ADMIN — HYDROCODONE BITARTRATE AND ACETAMINOPHEN 1 TABLET: 5; 325 TABLET ORAL at 00:27

## 2017-09-17 RX ADMIN — IBUPROFEN 600 MG: 600 TABLET, FILM COATED ORAL at 10:40

## 2017-09-17 RX ADMIN — Medication 1 TABLET: at 10:40

## 2017-09-17 RX ADMIN — IBUPROFEN 600 MG: 600 TABLET, FILM COATED ORAL at 02:44

## 2017-09-17 RX ADMIN — HYDROCODONE BITARTRATE AND ACETAMINOPHEN 1 TABLET: 5; 325 TABLET ORAL at 05:55

## 2017-09-17 RX ADMIN — HYDROCODONE BITARTRATE AND ACETAMINOPHEN 1 TABLET: 5; 325 TABLET ORAL at 10:40

## 2017-09-17 ASSESSMENT — PAIN SCALES - GENERAL
PAINLEVEL_OUTOF10: 2
PAINLEVEL_OUTOF10: 0
PAINLEVEL_OUTOF10: 4

## 2017-09-17 ASSESSMENT — LIFESTYLE VARIABLES: EVER_SMOKED: YES

## 2017-09-17 NOTE — DISCHARGE PLANNING
Discharge Planning Assessment Post Partum    Reason for Referral: History of Anxiety. MARCELLA had a child with SIDS.  Address: [address] [place] [address]    Type of Living Situation: MARCELLA lives with two of her children.   Mom Diagnosis: Intrauterine pregnancy at 39 2/7 weeks gestation  Baby Diagnosis: Term infant. Routine  care.  Primary Language: English    Name of Baby: Arianna Logan   Father of the Baby: Stephen Logan  Involved in baby’s care? Yes  Contact Information: [phone]    Prenatal Care: Yes, with Doctor Jake  Mom's PCP: [facility]  [facility]    Support System: Sister, father, cousin, and FOB  Coping/Bonding between mother & baby: Yes,per MOB. Yes, per bedside nurse Marian  Source of Feeding: Breastfeeding and may need to supplement  Supplies for Infant: Yes, Car seat, blankets, clothing, and other supplies.     Mom's Insurance: [employer]  Baby Covered on Insurance:Yes, MOB reports she has insurance card for baby already  Mother Employed/School: Yes, [employer]  Other children in the home/names & ages: Roderick Finn age 10 and Ke Salguero age 4    Financial Hardship/Income: No, per MOB   Mom's Mental status: Alert and oriented times 4, Mood: Euthymic, Affect: Congruent with mood. Easy to build rapport with . No evidence of SI or HI per conversation. Speech: within normal limits. Memory, Insight, and Judgement: Within normal limites  Services used prior to admit: Medicaid, SNAP, and TANF    CPS History: No  Psychiatric History: Yes, Anxiety. MARCELLA reports she entered therapy after her child had SIDS. MOB reports she is seen [facility]. MOB reports she no longer takes anxiety medication due to pregnancy and will continue to not take the medication as she reports her mood is better off the medication. This writer reviewed post partum depression signs with MARCELLA. MOB reports no other psychiatric history.   Domestic  Violence History: No  Drug/ETOH History: No    Resources Provided: None  Referrals Made: None     Clearance for Discharge: MOB and baby are cleared by this writer. MOB reports she has needs at this time and is prepared for baby at home and declined any resources at this time.

## 2017-09-17 NOTE — DISCHARGE SUMMARY
DATE OF ADMISSION:  2017    DATE OF DISCHARGE:  2017    DISCHARGE DIAGNOSES:  1.  Status post a repeat low transverse  section at 39 and 2/7 weeks   gestation.  2.  Uncomplicated postoperative and postpartum courses.    HISTORY OF PRESENT ILLNESS:  This is a 26-year-old at 39 and 2/7 weeks   gestation with a history of a prior low transverse  section, now   elects to proceed forward with elective repeat low transverse    section.  The risks, benefits, and alternatives have been addressed.  She   asked appropriate questions, signed the appropriate consents, and wished to   proceed forward with delivery as planned.    PAST MEDICAL HISTORY AND PHYSICAL EXAMINATION:  Can be found in dictated   history and physical.    ASSESSMENT AND PLAN:  The patient is a 26-year-old at 39 and 2/7 weeks   gestation with a history of  section, now electing to proceed forward   with elective repeat low transverse  section.    HOSPITAL COURSE:  As stated above, the patient did undergo the above procedure   with an estimated blood loss of 500 mL.  Apgars were 8 and 9 at 1 and 5   minutes respectively.  Her postpartum course was unremarkable and on   postoperative day #3, she had met all discharge criteria.  She was ambulating   and voiding well, tolerating a regular diet.  Her pain was well controlled.    She was felt to be appropriate for discharge.    DISCHARGE PLAN:  To follow up in 2 weeks for incisional evaluation.    DISCHARGE INSTRUCTIONS:  She is to call with any increased temperature greater   than 100.4, increasing vaginal bleeding, abdominal pain unrelieved with any   p.o. pain medication or call with any other questions or concerns.       ____________________________________     MD LUDIN LAYNE / FATMATA    DD:  2017 06:50:57  DT:  2017 07:25:40    D#:  [id number]  Job#:  [id number]

## 2017-09-17 NOTE — PROGRESS NOTES
Patient educated when to call md for post delivery hemmorage. Patient education to feed every 2-3 hours on demand. Patient educated to call for follow up appointment in 1 weeks at Obstetrician for incision check . Patient received discharge education and denies self care questions.

## 2017-09-17 NOTE — DISCHARGE INSTRUCTIONS
POSTPARTUM DISCHARGE INSTRUCTIONS FOR MOM    YOB: 1990   Age: 26 y.o.               Admit Date: 2017     Discharge Date: 2017  Attending Doctor:  Thor Joseph M.D.                  Allergies:  Other environmental    Discharged to home by car. Discharged via wheelchair, hospital escort: Yes.  Special equipment needed: Not Applicable  Belongings with: Personal  Be sure to schedule a follow-up appointment with your primary care doctor or any specialists as instructed.     Discharge Plan:   Diet Plan: Discussed  Activity Level: Discussed  Smoking Cessation Offered: Patient Refused  Confirmed Follow up Appointment: Patient to Call and Schedule Appointment  Confirmed Symptoms Management: Discussed  Medication Reconciliation Updated: Yes  Influenza Vaccine Indication: Patient Refuses    REASONS TO CALL YOUR OBSTETRICIAN:  1.   Persistent fever or shaking chills (Temperature higher than 100.4)  2.   Heavy bleeding (soaking more than 1 pad per hour); Passing clots  3.   Foul odor from vagina  4.   Mastitis (Breast infection; breast pain, chills, fever, redness)  5.   Urinary pain, burning or frequency  6.   Episiotomy infection  7.   Abdominal incision infection  8.   Severe depression longer than 24 hours    HAND WASHING  · Prior to handling the baby.  · Before breastfeeding or bottle feeding baby.  · After using the bathroom or changing the baby's diaper.    WOUND CARE  Ask your physician for additional care instructions.  In general:    ·  Incision:      · Keep clean and dry.    · Do NOT lift anything heavier than your baby for up to 6 weeks.    · There should not be any opening or pus.      VAGINAL CARE  · Nothing inside vagina for 6 weeks: no sexual intercourse, tampons or douching.  · Bleeding may continue for 2-4 weeks.  Amount may vary.    · Call your physician for heavy bleeding which means soaking more than 1 pad per hour    BIRTH CONTROL  · It is possible to become pregnant at any  "time after delivery and while breastfeeding.  · Plan to discuss a method of birth control with your physician at your follow up visit. visit.    DIET AND ELIMINATION  · Eating more fiber (bran cereal, fruits, and vegetables) and drinking plenty of fluids will help to avoid constipation.  · Urinary frequency after childbirth is normal.    POSTPARTUM BLUES  During the first few days after birth, you may experience a sense of the \"blues\" which may include impatience, irritability or even crying.  These feeling come and go quickly.  However, as many as 1 in 10 women experience emotional symptoms known as postpartum depression.    Postpartum depression:  May start as early as the second or third day after delivery or take several weeks or months to develop.  Symptoms of \"blues\" are present, but are more intense:  Crying spells; loss of appetite; feelings of hopelessness or loss of control; fear of touching the baby; over concern or no concern at all about the baby; little or no concern about your own appearance/caring for yourself; and/or inability to sleep or excessive sleeping.  Contact your physician if you are experiencing any of these symptoms.    Crisis Hotline:  · [place] Crisis Hotline:  2-877-IMOFFKK  Or 1-366.385.9124  · [place] Crisis Hotline:  1-618.687.7501  Or 651-493-8667    PREVENTING SHAKEN BABY:  If you are angry or stressed, PUT THE BABY IN THE CRIB, step away, take some deep breaths, and wait until you are calm to care for the baby.  DO NOT SHAKE THE BABY.  You are not alone, call a supporter for help.    · Crisis Call Center 24/7 crisis line 616-586-2542 or 1-412.713.2924  · You can also text them, text \"ANSWER\" to 217723    QUIT SMOKING/TOBACCO USE:  I understand the use of any tobacco products increases my chance of suffering from future heart disease and could cause other illnesses which may shorten my life. Quitting the use of tobacco products is the single most important thing I can do to " improve my health. For further information on smoking / tobacco cessation call a Toll Free Quit Line at 1-681.741.3058 (*[employer] [place]) or 1-426.528.7391 (American Lung Association) or you can access the web based program at www.lungusa.org.    · [place] Tobacco Users Help Line:  (211) 293-4140       Toll Free: 1-769.263.1641  · Quit Tobacco Program [facility] (163)681-4394    DEPRESSION / SUICIDE RISK:  As you are discharged from this [facility], it is important to learn how to keep safe from harming yourself.    Recognize the warning signs:  · Abrupt changes in personality, positive or negative- including increase in energy   · Giving away possessions  · Change in eating patterns- significant weight changes-  positive or negative  · Change in sleeping patterns- unable to sleep or sleeping all the time   · Unwillingness or inability to communicate  · Depression  · Unusual sadness, discouragement and loneliness  · Talk of wanting to die  · Neglect of personal appearance   · Rebelliousness- reckless behavior  · Withdrawal from people/activities they love  · Confusion- inability to concentrate     If you or a loved one observes any of these behaviors or has concerns about self-harm, here's what you can do:  · Talk about it- your feelings and reasons for harming yourself  · Remove any means that you might use to hurt yourself (examples: pills, rope, extension cords, firearm)  · Get professional help from the community (Mental Health, Substance Abuse, psychological counseling)  · Do not be alone:Call your Safe Contact- someone whom you trust who will be there for you.  · Call your local CRISIS HOTLINE 386-2756 or 479-170-7809  · Call your local Children's Mobile Crisis Response Team [place] (510) 364-1655 or www.QuickPay  · Call the toll free National Suicide Prevention Hotlines   · National Suicide Prevention Lifeline 902-481-GIDR (4122)  · National Hope Line  [facility] 800-SUICIDE (452-0020)    DISCHARGE SURVEY:  Thank you for choosing [facility].  We hope we provided you with very good care.  You may be receiving a survey in the mail.  Please fill it out.  Your opinion is valuable to us.    ADDITIONAL EDUCATIONAL MATERIALS GIVEN TO PATIENT:        My signature on this form indicates that:  1.  I have reviewed and understand the above information  2.  My questions regarding this information have been answered to my satisfaction.  3.  I have formulated a plan with my discharge nurse to obtain my prescribed medication for home.

## 2017-09-17 NOTE — CARE PLAN
Problem: Altered physiologic condition related to postoperative  delivery  Goal: Patient physiologically stable as evidenced by normal lochia, palpable uterine involution and vital signs within normal limits  Outcome: PROGRESSING AS EXPECTED  Fundus firm @ U, lochia rubra minimal. Surgical incision covered with dressing CDI. V/S within defined limits.

## 2017-09-17 NOTE — PROGRESS NOTES
Progress Note    Subjective:  Doing well. No issues or concerns. Pain well controlled. No sig bleeding. BF well.    Objective Data:  Recent Labs      09/14/17   1045  09/14/17   2203   WBC  10.0  17.5*   RBC  4.11*  3.59*   HEMOGLOBIN  12.2  10.9*   HEMATOCRIT  36.0*  31.7*   MCV  87.6  88.3   MCH  29.7  30.4   MCHC  33.9  34.4   RDW  45.7  45.8   PLATELETCT  184  158*   MPV  11.3  11.4           Vitals:    09/15/17 1200 09/15/17 2000 09/16/17 0800 09/16/17 2000   BP: (!) 99/58 100/60 109/66 102/61   Pulse: 75 64 (!) 55 73   Resp: 16 (!) 24 20 16   Temp: 36.8 °C (98.2 °F) 36.8 °C (98.2 °F) 36.8 °C (98.2 °F) 36.4 °C (97.6 °F)   SpO2: 97% 94% 97% 97%   Weight:       Height:         Abdomen: soft non tender fundus   Perineum: no sig bleeding  Ext:n on tender calves    No intake or output data in the 24 hours ending 09/17/17 0644    Current Facility-Administered Medications   Medication Dose Route Frequency Provider Last Rate Last Dose   • hydrocodone-acetaminophen (NORCO) 5-325 MG per tablet 1 Tab  1 Tab Oral Q4HRS PRN Thor Joseph M.D.   1 Tab at 09/17/17 0555   • oxycodone-acetaminophen (PERCOCET) 5-325 MG per tablet 2 Tab  2 Tab Oral Q4HRS PRN Thor Joseph M.D.       • morphine (pf) 4 mg/ml injection 4 mg  4 mg Intramuscular Q3HRS PRN Thor Joseph M.D.       • ondansetron (ZOFRAN) syringe/vial injection 4 mg  4 mg Intravenous Q6HRS PRN Thor Joseph M.D.        Or   • ondansetron (ZOFRAN ODT) dispertab 4 mg  4 mg Oral Q6HRS PRN Thor Joseph M.D.       • metoclopramide (REGLAN) injection 10 mg  10 mg Intravenous Q6HRS PRN Thor Joseph M.D.       • diphenhydrAMINE (BENADRYL) tablet/capsule 25 mg  25 mg Oral Q6HRS PRN Thro Joseph M.D.        Or   • diphenhydrAMINE (BENADRYL) injection 25 mg  25 mg Intravenous Q6HRS PRN Thor Joseph M.D.       • ibuprofen (MOTRIN) tablet 600 mg  600 mg Oral Q6HRS PRN Thor Joseph M.D.   600 mg at 09/17/17 0244   • LR infusion   Intravenous PRN Thor  REINALDO Joseph M.D.       • misoprostol (CYTOTEC) tablet 600 mcg  600 mcg Rectal Once PRN Thor Joseph M.D.       • docusate sodium (COLACE) capsule 100 mg  100 mg Oral BID PRN Thor Joseph M.D.   100 mg at 09/16/17 1143   • bisacodyl (DULCOLAX) suppository 10 mg  10 mg Rectal PRN Thor Joseph M.D.       • simethicone (MYLICON) chewable tab 80 mg  80 mg Oral 4X/DAY PRN Thor Joseph M.D.   80 mg at 09/16/17 2034   • prenatal plus vitamin (STUARTNATAL 1+1) 27-1 MG tablet 1 Tab  1 Tab Oral QAM Thor Joseph M.D.   1 Tab at 09/16/17 1143   • acetaminophen (TYLENOL) tablet 325 mg  325 mg Oral Q4HRS PRN Thor Joseph M.D.           A/P S/P Repeat LTCS. Plan to proceed with the usual pp and post op managment. Plan to discharge home this am.

## 2017-09-17 NOTE — CARE PLAN
Problem: Potential anxiety related to difficulty adapting to parental role  Goal: Patient will verbalize and demonstrate effective bonding and parenting behavior    Intervention: Provide emotional support and encouragement to patient and family  Patient educated by pediatrician to supplement infant due to weight loss. Patient ambulating at will without assistance. Patient denies breastfeeding assistance upon assessment. Discussed latch with lactation consultant and patient to be seen prior to discharge.Patient will discharge this shift.

## 2018-01-12 ENCOUNTER — OFFICE VISIT (OUTPATIENT)
Dept: MEDICAL GROUP | Facility: MEDICAL CENTER | Age: 28
End: 2018-01-12
Attending: FAMILY MEDICINE
Payer: MEDICAID

## 2018-01-12 VITALS
DIASTOLIC BLOOD PRESSURE: 60 MMHG | RESPIRATION RATE: 16 BRPM | TEMPERATURE: 98.5 F | HEART RATE: 64 BPM | SYSTOLIC BLOOD PRESSURE: 92 MMHG | WEIGHT: 164 LBS | OXYGEN SATURATION: 94 % | HEIGHT: 62 IN | BODY MASS INDEX: 30.18 KG/M2

## 2018-01-12 DIAGNOSIS — L98.9 SKIN LESION OF FACE: ICD-10-CM

## 2018-01-12 PROCEDURE — 99212 OFFICE O/P EST SF 10 MIN: CPT | Performed by: FAMILY MEDICINE

## 2018-01-12 PROCEDURE — 99213 OFFICE O/P EST LOW 20 MIN: CPT | Performed by: FAMILY MEDICINE

## 2018-01-12 ASSESSMENT — PAIN SCALES - GENERAL: PAINLEVEL: NO PAIN

## 2018-01-12 ASSESSMENT — PATIENT HEALTH QUESTIONNAIRE - PHQ9: CLINICAL INTERPRETATION OF PHQ2 SCORE: 0

## 2018-01-12 NOTE — PROGRESS NOTES
Chief Complaint:   Chief Complaint   Patient presents with   • Referral Needed     dermatology, spot on nose        HPI, established patient  Natalya Salguero is a 27 y.o. female who presents for  requesting referral to see dermatology because of suspicious lesion on her nose    Skin lesion of face     Patient reports that she developed this lesion in the summer time when she was exposed to sun a lot, she noticed that the lesion has been increasing in size, she has family history of skin cancer. Would like to be referred to dermatology for further evaluation. No other skin lesions in other parts of her body, history of skin tanning.        Past medical history, family history, social history and medications reviewed and updated in the record. Today   Current medications, problem list and allergies reviewed in EPIC today   Health maintenance topics are reviewed and updated. Today     Patient Active Problem List    Diagnosis Date Noted   • Obesity (BMI 30-39.9) 11/04/2016   • Arm numbness left 05/20/2016   • Anxiety 05/20/2016   • History of ulcer disease 05/20/2016   • Postpartum care and examination of lactating mother 01/02/2013     Family History   Problem Relation Age of Onset   • Diabetes Mother    • Hyperlipidemia Mother    • Hypertension Mother    • Heart Disease Mother    • Hyperlipidemia Father    • Cancer Maternal Grandmother      lung cancer     Social History     Social History   • Marital status: Single     Spouse name: N/A   • Number of children: N/A   • Years of education: N/A     Occupational History   • Not on file.     Social History Main Topics   • Smoking status: Former Smoker   • Smokeless tobacco: Never Used   • Alcohol use 0.0 oz/week      Comment: socially   • Drug use: No   • Sexual activity: Not Currently     Partners: Male     Birth control/ protection: Condom     Other Topics Concern   • Not on file     Social History Narrative    ** Merged History Encounter **          Current  "Outpatient Prescriptions   Medication Sig Dispense Refill   • albuterol 108 (90 BASE) MCG/ACT Aero Soln inhalation aerosol Inhale 2 Puffs by mouth every 6 hours as needed for Shortness of Breath. 8.5 g 3   • buPROPion (WELLBUTRIN) 75 MG Tab TAKE 1 TABLET(S) TWICE A DAY BY ORAL ROUTE AS DIRECTED FOR 30 DAYS.  0   • busPIRone (BUSPAR) 5 MG Tab TAKE 1 TABLET(S) 3 TIMES A DAY BY ORAL ROUTE AS DIRECTED FOR 30 DAYS.  0   • omeprazole (PRILOSEC OTC) 20 MG tablet Take 1 Tab by mouth 1 time daily as needed. 30 Tab 1   • Misc. Devices Misc As directed 1 Application 0     No current facility-administered medications for this visit.            Review Of Systems  As documented in HPI above  PHYSICAL EXAMINATION:    BP (!) 92/60   Pulse 64   Temp 36.9 °C (98.5 °F)   Resp 16   Ht 1.575 m (5' 2.01\")   Wt 74.4 kg (164 lb)   LMP 12/09/2017   SpO2 94%   BMI 29.99 kg/m²   Gen.: Well-developed, well-nourished, no apparent distress, pleasant and cooperative with the examination  HEENT: Normocephalic/atraumatic,     Skin: Noted rounded circular skin lesion that is around 2 mm x 2 mm in size. No irregular borders. No change in color located at the nasal bridge on the left side.  ASSESSMENT/Plan:  1. Skin lesion of face  New problem    Since the patient is in the face. Will refer patient to dermatology for biopsy and removal to prevent scarring and to rule out skin cancer.    REFERRAL TO DERMATOLOGY     Please note that this dictation was created using voice recognition software. I have made every reasonable attempt to correct obvious errors but there may be errors of grammar and content that I may have overlooked prior to finalization of this note.         "

## 2018-04-27 ENCOUNTER — HOSPITAL ENCOUNTER (EMERGENCY)
Facility: MEDICAL CENTER | Age: 28
End: 2018-04-27
Attending: EMERGENCY MEDICINE

## 2018-04-27 VITALS
WEIGHT: 138.45 LBS | OXYGEN SATURATION: 97 % | DIASTOLIC BLOOD PRESSURE: 72 MMHG | RESPIRATION RATE: 14 BRPM | BODY MASS INDEX: 25.48 KG/M2 | SYSTOLIC BLOOD PRESSURE: 120 MMHG | TEMPERATURE: 97.3 F | HEIGHT: 62 IN | HEART RATE: 76 BPM
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
## 2018-04-27 PROCEDURE — 99282 EMERGENCY DEPT VISIT SF MDM: CPT

## 2018-04-27 ASSESSMENT — PAIN SCALES - GENERAL: PAINLEVEL_OUTOF10: 4

## 2018-04-27 NOTE — ED TRIAGE NOTES
"Chief Complaint   Patient presents with   • Bug Bite     \"I woke up this morning at 1am and noticed a bug bite to my toes\"; RT foot, 1st and 2nd digit.     Pt amb to triage with above. CMS intact, denies numbness/tingling. [facility] Educated on triage process and to inform staff of any changes.     /72   Pulse 76   Temp 36.3 °C (97.3 °F) (Temporal)   Resp 14   Ht 1.575 m (5' 2\")   Wt 62.8 kg (138 lb 7.2 oz)   LMP 12/09/2017   SpO2 97%   BMI 25.32 kg/m²     "

## 2018-04-27 NOTE — ED NOTES
"Pt said she was going to restroom around 0100 when she noticed her toe hurt. States she has found two black widows in her house in the past. Pt said, \"I don't know if this is just my anxiety, but I think I feel sick after.\" Nurse told pt to leave sock off foot so provider can examine toes. Pt expressed understanding.   "

## 2018-04-27 NOTE — ED PROVIDER NOTES
"ED Provider Note    ED Provider Note      Primary care provider: Jim Anrdews M.D.    CHIEF COMPLAINT  Chief Complaint   Patient presents with   • Bug Bite     \"I woke up this morning at 1am and noticed a bug bite to my toes\"; RT foot, 1st and 2nd digit.       HPI  Natalya Salguero is a 27 y.o. female who presents to the Emergency Department with chief complaint of bug bite. Patient woke up and noticed bug bites on the 1st and 2nd toes the right foot. Felt a stinging sensation in that area and slight itching in that area. Patient concerned because she seen several black windows in and around her house recently. Patient felt somewhat nauseous afterwards she's had no fevers no chills no vomiting she reports no abdominal pain or other muscle spasms. No headache altered mental status cough congestion chest pain shortness of breath no other acute concerns at this time.    REVIEW OF SYSTEMS  10 systems reviewed and otherwise negative, pertinent positives and negatives listed in the history of present illness.    PAST MEDICAL HISTORY   has a past medical history of Anemia; Anesthesia; Anxiety; Depression; Gastric ulcer, chronic; Pregnancy; Psychiatric problem; Ulcer (CMS-[facility]) (2009); and Ulcer (CMS-[facility]) (09/2009).    SURGICAL HISTORY   has a past surgical history that includes other abdominal surgery (2009); repeat c section (9/14/2017); laparoscopy (3/22/2010); abdominal exploration (09/2009); and primary c section (11/19/2012).    SOCIAL HISTORY  Social History   Substance Use Topics   • Smoking status: Former Smoker     Packs/day: 0.50     Years: 6.00     Types: Cigarettes   • Smokeless tobacco: Never Used   • Alcohol use 0.0 oz/week      Comment: socially      History   Drug Use No       FAMILY HISTORY  Non-Contributory    CURRENT MEDICATIONS  Home Medications    **Home medications have not yet been reviewed for this encounter**         ALLERGIES  Allergies   Allergen Reactions   • Other Environmental      " "Adhesive tape causes blisters       PHYSICAL EXAM  VITAL SIGNS: /72   Pulse 76   Temp 36.3 °C (97.3 °F) (Temporal)   Resp 14   Ht 1.575 m (5' 2\")   Wt 62.8 kg (138 lb 7.2 oz)   LMP 12/09/2017   SpO2 97%   BMI 25.32 kg/m²   Pulse ox interpretation: I interpret this pulse ox as normal.  Constitutional: Alert and oriented x 3, no acute Distress  HEENT: Atraumatic normocephalic, pupils are equal round reactive to light extraocular movements are intact. The nares is clear, external ears are normal, mouth shows moist mucous membranes  Neck: Supple, no JVD no tracheal deviation  Cardiovascular: Regular rate and rhythm no murmur rub or gallop 2+ pulses peripherally x4  Thorax & Lungs: No respiratory distress, no wheezes rales or rhonchi, No chest tenderness.   GI: Soft nontender nondistended positive bowel sounds, no peritoneal signs  : Deferred  Rectal: Deferred  Skin: Patient has 2 subcentimeter erythematous lesions over the dorsum of the 1st and 2nd digits on the right foot no retained foreign body no edema no fluctuance no drainage no lymphangitis  Musculoskeletal: Moving all extremities with full range and 5 of 5 strength, no acute  deformity  Neurologic: Cranial nerves III through XII are grossly intact, no sensory deficit, no cerebellar dysfunction   Psychiatric: Appropriate affect for situation at this time      DIAGNOSTIC STUDIES / PROCEDURES  LABS        COURSE & MEDICAL DECISION MAKING  Pertinent Labs & Imaging studies reviewed. (See chart for details)    3:47 AM - Patient seen and examined at bedside. Patient has no stigmata of black  envenomation. She has no muscle cramps no spasms no abdominal pain no vomiting. Her vital signs are unremarkable. No other signs of venomous sting. Likely benign insect bites. Given instructions on symptomatic management return for worsening symptoms or concerns otherwise discharged in stable condition.          Patient noted to have slightly elevated blood " "pressure likely circumstantial secondary to presenting complaint. Referred to primary care physician for further evaluation.            /72   Pulse 76   Temp 36.3 °C (97.3 °F) (Temporal)   Resp 14   Ht 1.575 m (5' 2\")   Wt 62.8 kg (138 lb 7.2 oz)   LMP 12/09/2017   SpO2 97%   BMI 25.32 kg/m²     Jim Andrews M.D.  [address] [id number] [phone]      As needed    [facility], Emergency Dept  [address] [facility] [address] [phone]    If symptoms worsen        FINAL IMPRESSION  1. Insect bite, initial encounter           This dictation has been created using voice recognition software and/or scribes. The accuracy of the dictation is limited by the abilities of the software and the expertise of the scribes. I expect there may be some errors of grammar and possibly content. I made every attempt to manually correct the errors within my dictation. However, errors related to voice recognition software and/or scribes may still exist and should be interpreted within the appropriate context.            "

## 2018-07-22 ENCOUNTER — HOSPITAL ENCOUNTER (EMERGENCY)
Facility: MEDICAL CENTER | Age: 28
End: 2018-07-22
Attending: EMERGENCY MEDICINE
Payer: MEDICAID

## 2018-07-22 VITALS
OXYGEN SATURATION: 98 % | DIASTOLIC BLOOD PRESSURE: 71 MMHG | HEART RATE: 71 BPM | HEIGHT: 62 IN | BODY MASS INDEX: 23.19 KG/M2 | RESPIRATION RATE: 14 BRPM | WEIGHT: 126 LBS | TEMPERATURE: 97.7 F | SYSTOLIC BLOOD PRESSURE: 110 MMHG

## 2018-07-22 DIAGNOSIS — L02.91 ABSCESS: ICD-10-CM

## 2018-07-22 PROCEDURE — 700101 HCHG RX REV CODE 250

## 2018-07-22 PROCEDURE — 304217 HCHG IRRIGATION SYSTEM

## 2018-07-22 PROCEDURE — 99283 EMERGENCY DEPT VISIT LOW MDM: CPT

## 2018-07-22 PROCEDURE — 303977 HCHG I & D

## 2018-07-22 RX ORDER — LIDOCAINE HYDROCHLORIDE AND EPINEPHRINE BITARTRATE 20; .01 MG/ML; MG/ML
INJECTION, SOLUTION SUBCUTANEOUS
Status: COMPLETED
Start: 2018-07-22 | End: 2018-07-22

## 2018-07-22 RX ORDER — LIDOCAINE HYDROCHLORIDE AND EPINEPHRINE BITARTRATE 20; .01 MG/ML; MG/ML
10 INJECTION, SOLUTION SUBCUTANEOUS ONCE
Status: COMPLETED | OUTPATIENT
Start: 2018-07-22 | End: 2018-07-22

## 2018-07-22 RX ADMIN — LIDOCAINE HYDROCHLORIDE AND EPINEPHRINE 10 ML: 20; 10 INJECTION, SOLUTION INFILTRATION; PERINEURAL at 14:13

## 2018-07-22 RX ADMIN — LIDOCAINE HYDROCHLORIDE AND EPINEPHRINE BITARTRATE 10 ML: 20; .01 INJECTION, SOLUTION SUBCUTANEOUS at 14:13

## 2018-07-22 ASSESSMENT — PAIN SCALES - GENERAL
PAINLEVEL_OUTOF10: 4
PAINLEVEL_OUTOF10: 8
PAINLEVEL_OUTOF10: 8

## 2018-07-22 NOTE — ED TRIAGE NOTES
"Ambulatory to triage with   Chief Complaint   Patient presents with   • Wound Check     Left buttock wound since Thursday night.      States possible spider bite. Denies fever. Fell generally unwell last night. States wound is draining and has some contents in a baggy which appear to be dried pus. Pt concerned its \"larvae.\"  "

## 2018-07-22 NOTE — ED NOTES
D/C instructions reviewed in detail with pt. Pt states understanding and need for follow-up. Pt left ambulatory with a steady gait.

## 2018-07-22 NOTE — ED PROVIDER NOTES
"ED Provider Note    CHIEF COMPLAINT  Chief Complaint   Patient presents with   • Wound Check     Left buttock wound since Thursday night.        HPI  Natalya Salguero is a 27 y.o. female who presents for evaluation of a wound to her left   Buttocks that began 4 days ago, she states she was out in the garden when she felt something either bite or sting her and afterwards she had a welt.  Over the past 4 days this developed into a large area swelling, she squeezed it and \"popped it like a big pimple\" when she did this however a few pieces of solid material came out, the patient's concern is her insects or insect eggs that she comes here for evaluation.  No fever, no vomiting, she is otherwise healthy and has no other specific complaints.    REVIEW OF SYSTEMS  Negative for fever, vomiting, abdominal pain.    PAST MEDICAL HISTORY   has a past medical history of Anemia; Anesthesia; Anxiety; Depression; Gastric ulcer, chronic; Pregnancy; Psychiatric problem; Ulcer (2009); and Ulcer (09/2009).    SOCIAL HISTORY  Social History     Social History Main Topics   • Smoking status: Current Every Day Smoker     Packs/day: 0.12     Years: 6.00     Types: Cigarettes   • Smokeless tobacco: Never Used   • Alcohol use 0.0 oz/week      Comment: Socially   • Drug use: No   • Sexual activity: Not Currently     Partners: Male     Birth control/ protection: Condom       SURGICAL HISTORY   has a past surgical history that includes other abdominal surgery (2009); repeat c section (9/14/2017); laparoscopy (3/22/2010); abdominal exploration (09/2009); and primary c section (11/19/2012).    CURRENT MEDICATIONS  I personally reviewed the medication list in the charting documentation.     ALLERGIES  Allergies   Allergen Reactions   • Other Environmental      Adhesive tape causes blisters       PHYSICAL EXAM  VITAL SIGNS: /73   Pulse (!) 109   Temp 36.6 °C (97.9 °F)   Resp 16   Ht 1.575 m (5' 2\")   Wt 57.2 kg (126 lb)   SpO2 98%   " Breastfeeding? Unknown   BMI 23.05 kg/m²   Constitutional: Alert in no apparent distress.  HENT: No signs of trauma.   Eyes: Conjunctiva normal, Non-icteric.   Chest: Normal nonlabored respirations  Skin: Inspection of left buttocks reveals a ulcerated lesion with minimal surrounding erythema, there is some purulent drainage from this lesion, no foreign bodies or other foreign material noted  Musculoskeletal: Good range of motion in all major joints.   Neurologic: Alert, No focal deficits noted.   Psychiatric: Affect normal, Judgment normal.    DIAGNOSTIC STUDIES / PROCEDURES    Incision and Drainage Procedure Note    Indication: Abscess    Procedure: The patient was positioned appropriately and the skin over the incision site was prepped with alcohol. Local anesthesia was obtained by infiltration using 1% Lidocaine with epinephrine.  An incision was then made over the center of the lesion and no material was expressed. Loculations were not present. The drainage cavity was then irrigated.    The patient tolerated the procedure well.    Complications: None    COURSE & MEDICAL DECISION MAKING  Pertinent Labs & Imaging studies reviewed. (See chart for details)    Encounter Summary: This is a 27 y.o. female with what seems to be an already drained abscess although she reports some foreign body sort of material coming from it, actually saw all the things that came from the wound and although nonspecific I suppose it could be some sort of insect larva but really not definitive, I did open this wound up to look for further purulent collections and really no further drainage occurred, no need for antibiotics, stable for discharge      DISPOSITION: Discharge Home      FINAL IMPRESSION  1. Abscess        This dictation was created using voice recognition software. The accuracy of the dictation is limited to the abilities of the software. I expect there may be some errors of grammar and possibly content. The nursing notes  were reviewed and certain aspects of this information were incorporated into this note.    Electronically signed by: Ruben Becerra, 7/22/2018 2:07 PM

## 2018-07-22 NOTE — DISCHARGE INSTRUCTIONS
Abscess  Care After  An abscess (also called a boil or furuncle) is an infected area that contains a collection of pus. Signs and symptoms of an abscess include pain, tenderness, redness, or hardness, or you may feel a moveable soft area under your skin. An abscess can occur anywhere in the body. The infection may spread to surrounding tissues causing cellulitis. A cut (incision) by the surgeon was made over your abscess and the pus was drained out. Gauze may have been packed into the space to provide a drain that will allow the cavity to heal from the inside outwards. The boil may be painful for 5 to 7 days. Most people with a boil do not have high fevers. Your abscess, if seen early, may not have localized, and may not have been lanced. If not, another appointment may be required for this if it does not get better on its own or with medications.  HOME CARE INSTRUCTIONS   · Only take over-the-counter or prescription medicines for pain, discomfort, or fever as directed by your caregiver.  · When you bathe, soak and then remove gauze or iodoform packs at least daily or as directed by your caregiver. You may then wash the wound gently with mild soapy water. Repack with gauze or do as your caregiver directs.  SEEK IMMEDIATE MEDICAL CARE IF:   · You develop increased pain, swelling, redness, drainage, or bleeding in the wound site.  · You develop signs of generalized infection including muscle aches, chills, fever, or a general ill feeling.  · An oral temperature above 102° F (38.9° C) develops, not controlled by medication.  See your caregiver for a recheck if you develop any of the symptoms described above. If medications (antibiotics) were prescribed, take them as directed.  Document Released: 07/06/2006 Document Revised: 03/11/2013 Document Reviewed: 03/02/2009  [employer]® Patient Information ©2014 [employer].

## 2018-07-22 NOTE — ED NOTES
Patient walked to er yellow care area room 62 ready to be seen. Placed her into gown, gave warm blanket, and call light

## 2018-07-22 NOTE — ED NOTES
Pt roomed from [facility], ambulatory. Gait steady. Pt is accompanied by spouse. Pt presents for symptoms as stated in the triage note. Abscess present to L buttock x3 days. Possible spider bite per pt. Pt states spouse opened area and tried to drain age. Purulent drainage noted. No current fever.

## 2019-09-30 ENCOUNTER — HOSPITAL ENCOUNTER (OUTPATIENT)
Dept: LAB | Facility: MEDICAL CENTER | Age: 29
End: 2019-09-30
Attending: SPECIALIST
Payer: MEDICAID

## 2019-09-30 PROCEDURE — 87624 HPV HI-RISK TYP POOLED RSLT: CPT

## 2019-09-30 PROCEDURE — 87491 CHLMYD TRACH DNA AMP PROBE: CPT

## 2019-09-30 PROCEDURE — 87591 N.GONORRHOEAE DNA AMP PROB: CPT

## 2019-09-30 PROCEDURE — 88175 CYTOPATH C/V AUTO FLUID REDO: CPT

## 2019-10-02 LAB — AMBIGUOUS DTTM AMBI4: NORMAL

## 2019-10-03 LAB
C TRACH DNA GENITAL QL NAA+PROBE: NEGATIVE
CYTOLOGY REG CYTOL: NORMAL
HPV HR 12 DNA CVX QL NAA+PROBE: NEGATIVE
HPV16 DNA SPEC QL NAA+PROBE: NEGATIVE
HPV18 DNA SPEC QL NAA+PROBE: NEGATIVE
N GONORRHOEA DNA GENITAL QL NAA+PROBE: NEGATIVE
SPECIMEN SOURCE: NORMAL
SPECIMEN SOURCE: NORMAL

## 2019-10-10 ENCOUNTER — HOSPITAL ENCOUNTER (EMERGENCY)
Facility: MEDICAL CENTER | Age: 29
End: 2019-10-11
Attending: EMERGENCY MEDICINE
Payer: MEDICAID

## 2019-10-10 ENCOUNTER — APPOINTMENT (OUTPATIENT)
Dept: RADIOLOGY | Facility: MEDICAL CENTER | Age: 29
End: 2019-10-10
Attending: EMERGENCY MEDICINE
Payer: MEDICAID

## 2019-10-10 DIAGNOSIS — N30.00 ACUTE CYSTITIS WITHOUT HEMATURIA: ICD-10-CM

## 2019-10-10 DIAGNOSIS — K29.00 ACUTE GASTRITIS WITHOUT HEMORRHAGE, UNSPECIFIED GASTRITIS TYPE: ICD-10-CM

## 2019-10-10 LAB
ALBUMIN SERPL BCP-MCNC: 4.6 G/DL (ref 3.2–4.9)
ALBUMIN/GLOB SERPL: 1.6 G/DL
ALP SERPL-CCNC: 56 U/L (ref 30–99)
ALT SERPL-CCNC: 12 U/L (ref 2–50)
ANION GAP SERPL CALC-SCNC: 8 MMOL/L (ref 0–11.9)
APPEARANCE UR: ABNORMAL
AST SERPL-CCNC: 12 U/L (ref 12–45)
BACTERIA #/AREA URNS HPF: ABNORMAL /HPF
BASOPHILS # BLD AUTO: 1.2 % (ref 0–1.8)
BASOPHILS # BLD: 0.1 K/UL (ref 0–0.12)
BILIRUB SERPL-MCNC: 0.3 MG/DL (ref 0.1–1.5)
BILIRUB UR QL STRIP.AUTO: NEGATIVE
BUN SERPL-MCNC: 16 MG/DL (ref 8–22)
CALCIUM SERPL-MCNC: 9.6 MG/DL (ref 8.5–10.5)
CHLORIDE SERPL-SCNC: 105 MMOL/L (ref 96–112)
CO2 SERPL-SCNC: 26 MMOL/L (ref 20–33)
COLOR UR: YELLOW
CREAT SERPL-MCNC: 0.81 MG/DL (ref 0.5–1.4)
EOSINOPHIL # BLD AUTO: 0.12 K/UL (ref 0–0.51)
EOSINOPHIL NFR BLD: 1.5 % (ref 0–6.9)
EPI CELLS #/AREA URNS HPF: ABNORMAL /HPF
ERYTHROCYTE [DISTWIDTH] IN BLOOD BY AUTOMATED COUNT: 45.1 FL (ref 35.9–50)
GLOBULIN SER CALC-MCNC: 2.8 G/DL (ref 1.9–3.5)
GLUCOSE SERPL-MCNC: 88 MG/DL (ref 65–99)
GLUCOSE UR STRIP.AUTO-MCNC: NEGATIVE MG/DL
HCG SERPL QL: NEGATIVE
HCT VFR BLD AUTO: 42.7 % (ref 37–47)
HGB BLD-MCNC: 13.9 G/DL (ref 12–16)
HYALINE CASTS #/AREA URNS LPF: ABNORMAL /LPF
IMM GRANULOCYTES # BLD AUTO: 0.02 K/UL (ref 0–0.11)
IMM GRANULOCYTES NFR BLD AUTO: 0.2 % (ref 0–0.9)
KETONES UR STRIP.AUTO-MCNC: NEGATIVE MG/DL
LEUKOCYTE ESTERASE UR QL STRIP.AUTO: ABNORMAL
LIPASE SERPL-CCNC: 24 U/L (ref 11–82)
LYMPHOCYTES # BLD AUTO: 2.97 K/UL (ref 1–4.8)
LYMPHOCYTES NFR BLD: 36.8 % (ref 22–41)
MCH RBC QN AUTO: 29.8 PG (ref 27–33)
MCHC RBC AUTO-ENTMCNC: 32.6 G/DL (ref 33.6–35)
MCV RBC AUTO: 91.6 FL (ref 81.4–97.8)
MICRO URNS: ABNORMAL
MONOCYTES # BLD AUTO: 0.7 K/UL (ref 0–0.85)
MONOCYTES NFR BLD AUTO: 8.7 % (ref 0–13.4)
NEUTROPHILS # BLD AUTO: 4.17 K/UL (ref 2–7.15)
NEUTROPHILS NFR BLD: 51.6 % (ref 44–72)
NITRITE UR QL STRIP.AUTO: POSITIVE
NRBC # BLD AUTO: 0 K/UL
NRBC BLD-RTO: 0 /100 WBC
PH UR STRIP.AUTO: 6.5 [PH] (ref 5–8)
PLATELET # BLD AUTO: 284 K/UL (ref 164–446)
PMV BLD AUTO: 9.5 FL (ref 9–12.9)
POTASSIUM SERPL-SCNC: 3.9 MMOL/L (ref 3.6–5.5)
PROT SERPL-MCNC: 7.4 G/DL (ref 6–8.2)
PROT UR QL STRIP: NEGATIVE MG/DL
RBC # BLD AUTO: 4.66 M/UL (ref 4.2–5.4)
RBC # URNS HPF: ABNORMAL /HPF
RBC UR QL AUTO: NEGATIVE
SODIUM SERPL-SCNC: 139 MMOL/L (ref 135–145)
SP GR UR STRIP.AUTO: 1.02
UROBILINOGEN UR STRIP.AUTO-MCNC: 0.2 MG/DL
WBC # BLD AUTO: 8.1 K/UL (ref 4.8–10.8)
WBC #/AREA URNS HPF: ABNORMAL /HPF

## 2019-10-10 PROCEDURE — 85025 COMPLETE CBC W/AUTO DIFF WBC: CPT

## 2019-10-10 PROCEDURE — 84703 CHORIONIC GONADOTROPIN ASSAY: CPT

## 2019-10-10 PROCEDURE — 87086 URINE CULTURE/COLONY COUNT: CPT

## 2019-10-10 PROCEDURE — 87186 SC STD MICRODIL/AGAR DIL: CPT

## 2019-10-10 PROCEDURE — 80053 COMPREHEN METABOLIC PANEL: CPT

## 2019-10-10 PROCEDURE — 81001 URINALYSIS AUTO W/SCOPE: CPT

## 2019-10-10 PROCEDURE — 87077 CULTURE AEROBIC IDENTIFY: CPT

## 2019-10-10 PROCEDURE — 83690 ASSAY OF LIPASE: CPT

## 2019-10-10 PROCEDURE — 96375 TX/PRO/DX INJ NEW DRUG ADDON: CPT

## 2019-10-10 PROCEDURE — 700111 HCHG RX REV CODE 636 W/ 250 OVERRIDE (IP): Performed by: EMERGENCY MEDICINE

## 2019-10-10 PROCEDURE — 99285 EMERGENCY DEPT VISIT HI MDM: CPT

## 2019-10-10 RX ORDER — ONDANSETRON 2 MG/ML
4 INJECTION INTRAMUSCULAR; INTRAVENOUS ONCE
Status: COMPLETED | OUTPATIENT
Start: 2019-10-10 | End: 2019-10-10

## 2019-10-10 RX ORDER — MORPHINE SULFATE 4 MG/ML
4 INJECTION, SOLUTION INTRAMUSCULAR; INTRAVENOUS ONCE
Status: COMPLETED | OUTPATIENT
Start: 2019-10-10 | End: 2019-10-10

## 2019-10-10 RX ADMIN — MORPHINE SULFATE 4 MG: 4 INJECTION INTRAVENOUS at 23:06

## 2019-10-10 RX ADMIN — ONDANSETRON 4 MG: 2 INJECTION INTRAMUSCULAR; INTRAVENOUS at 23:06

## 2019-10-10 ASSESSMENT — LIFESTYLE VARIABLES
HAVE PEOPLE ANNOYED YOU BY CRITICIZING YOUR DRINKING: NO
CONSUMPTION TOTAL: INCOMPLETE
EVER HAD A DRINK FIRST THING IN THE MORNING TO STEADY YOUR NERVES TO GET RID OF A HANGOVER: NO
DOES PATIENT WANT TO STOP DRINKING: NO
EVER FELT BAD OR GUILTY ABOUT YOUR DRINKING: NO
TOTAL SCORE: 0
HAVE YOU EVER FELT YOU SHOULD CUT DOWN ON YOUR DRINKING: NO
TOTAL SCORE: 0
DO YOU DRINK ALCOHOL: NO
TOTAL SCORE: 0

## 2019-10-11 VITALS
OXYGEN SATURATION: 96 % | TEMPERATURE: 97.9 F | SYSTOLIC BLOOD PRESSURE: 99 MMHG | DIASTOLIC BLOOD PRESSURE: 50 MMHG | WEIGHT: 161 LBS | HEIGHT: 62 IN | HEART RATE: 71 BPM | RESPIRATION RATE: 16 BRPM | BODY MASS INDEX: 29.63 KG/M2

## 2019-10-11 PROCEDURE — 74177 CT ABD & PELVIS W/CONTRAST: CPT

## 2019-10-11 PROCEDURE — 700102 HCHG RX REV CODE 250 W/ 637 OVERRIDE(OP): Performed by: EMERGENCY MEDICINE

## 2019-10-11 PROCEDURE — 96365 THER/PROPH/DIAG IV INF INIT: CPT | Mod: XU

## 2019-10-11 PROCEDURE — 700117 HCHG RX CONTRAST REV CODE 255: Performed by: EMERGENCY MEDICINE

## 2019-10-11 PROCEDURE — A9270 NON-COVERED ITEM OR SERVICE: HCPCS | Performed by: EMERGENCY MEDICINE

## 2019-10-11 PROCEDURE — 700111 HCHG RX REV CODE 636 W/ 250 OVERRIDE (IP): Performed by: EMERGENCY MEDICINE

## 2019-10-11 PROCEDURE — 700105 HCHG RX REV CODE 258: Performed by: EMERGENCY MEDICINE

## 2019-10-11 RX ORDER — SULFAMETHOXAZOLE AND TRIMETHOPRIM 800; 160 MG/1; MG/1
1 TABLET ORAL EVERY 12 HOURS
Qty: 10 TAB | Refills: 0 | Status: SHIPPED | OUTPATIENT
Start: 2019-10-11 | End: 2019-10-16

## 2019-10-11 RX ORDER — OMEPRAZOLE 20 MG/1
20 CAPSULE, DELAYED RELEASE ORAL 2 TIMES DAILY
Qty: 60 CAP | Refills: 0 | Status: SHIPPED | OUTPATIENT
Start: 2019-10-11 | End: 2019-11-10

## 2019-10-11 RX ADMIN — CEFTRIAXONE SODIUM 2 G: 2 INJECTION, POWDER, FOR SOLUTION INTRAMUSCULAR; INTRAVENOUS at 00:02

## 2019-10-11 RX ADMIN — LIDOCAINE HYDROCHLORIDE 30 ML: 20 SOLUTION OROPHARYNGEAL at 00:29

## 2019-10-11 RX ADMIN — IOHEXOL 100 ML: 350 INJECTION, SOLUTION INTRAVENOUS at 00:12

## 2019-10-11 NOTE — ED NOTES
Patient verbalized understanding of discharge instructions, provided with discharge paperwork, gait steady, ambulated independently to CARLOS white.

## 2019-10-11 NOTE — ED TRIAGE NOTES
"Pt to triage with c/o severe middle upper abd pain x 24 hours, denies trauma/fever, states hx perf ulcer in 2009 with surgical intervention, states \"this feels the like the same thing\", aox4, resp even/unlabored, no relief from OTC meds PTA  "

## 2019-10-11 NOTE — ED PROVIDER NOTES
ED Provider Note    ED Provider Note      Primary care provider: Jim Andrews M.D.    CHIEF COMPLAINT  Chief Complaint   Patient presents with   • Abdominal Pain     x 1 day - hx perf ulcer 2009       HPI  Natalya Salguero is a 28 y.o. female who presents to the Emergency Department with chief complaint of fantasma pain x2 days.  She reports severe epigastric abdominal pain that sharp stabbing in nature.  She states that she took some oral GI medication at home and it only made her symptoms worse.  She reports no fevers no chills she is had nausea one episode of emesis no blood in her emesis she is had no diarrhea she reports no urinary complaints no stooling abnormalities no vaginal bleeding or discharge she denies pregnancy states pain is currently a 10 out of 10 without other modifying factors.  She denies heavy NSAID use she denies having alcohol consumption she does have history of perforated gastric ulcer several years prior.    REVIEW OF SYSTEMS  10 systems reviewed and otherwise negative, pertinent positives and negatives listed in the history of present illness.    PAST MEDICAL HISTORY   has a past medical history of Anemia, Anesthesia, Anxiety, Depression, Gastric ulcer, chronic, Pregnancy, Psychiatric problem, Ulcer (2009), and Ulcer (09/2009).    SURGICAL HISTORY   has a past surgical history that includes other abdominal surgery (2009); repeat c section (9/14/2017); laparoscopy (3/22/2010); abdominal exploration (09/2009); and primary c section (11/19/2012).    SOCIAL HISTORY  Social History     Tobacco Use   • Smoking status: Current Every Day Smoker     Packs/day: 0.12     Years: 6.00     Pack years: 0.72     Types: Cigarettes   • Smokeless tobacco: Never Used   Substance Use Topics   • Alcohol use: Yes     Alcohol/week: 0.0 oz     Comment: Socially   • Drug use: No      Social History     Substance and Sexual Activity   Drug Use No       FAMILY HISTORY  Non-Contributory    CURRENT  "MEDICATIONS  Home Medications     Reviewed by Trenton Raman R.N. (Registered Nurse) on 10/10/19 at 2113  Med List Status: Not Addressed   Medication Last Dose Status        Patient Mina Taking any Medications                       ALLERGIES  Allergies   Allergen Reactions   • Other Environmental      Adhesive tape causes blisters       PHYSICAL EXAM  VITAL SIGNS: /70   Pulse 68   Temp 36.6 °C (97.9 °F) (Temporal)   Resp 16   Ht 1.575 m (5' 2\")   Wt 73 kg (161 lb)   SpO2 98%   BMI 29.45 kg/m²   Pulse ox interpretation: I interpret this pulse ox as normal.  Constitutional: Alert and oriented x 3, minimal distress  HEENT: Atraumatic normocephalic, pupils are equal round reactive to light extraocular movements are intact. The nares is clear, external ears are normal, mouth shows moist mucous membranes  Neck: Supple, no JVD no tracheal deviation  Cardiovascular: Regular rate and rhythm no murmur rub or gallop 2+ pulses peripherally x4  Thorax & Lungs: No respiratory distress, no wheezes rales or rhonchi, No chest tenderness.   GI: Soft nontender nondistended positive bowel sounds, no peritoneal signs  Skin: Warm dry no acute rash or lesion  Musculoskeletal: Moving all extremities with full range and 5 of 5 strength, no acute  deformity  Neurologic: Cranial nerves III through XII are grossly intact, no sensory deficit, no cerebellar dysfunction   Psychiatric: Appropriate affect for situation at this time      DIAGNOSTIC STUDIES / PROCEDURES  LABS      Results for orders placed or performed during the hospital encounter of 10/10/19   CBC WITH DIFFERENTIAL   Result Value Ref Range    WBC 8.1 4.8 - 10.8 K/uL    RBC 4.66 4.20 - 5.40 M/uL    Hemoglobin 13.9 12.0 - 16.0 g/dL    Hematocrit 42.7 37.0 - 47.0 %    MCV 91.6 81.4 - 97.8 fL    MCH 29.8 27.0 - 33.0 pg    MCHC 32.6 (L) 33.6 - 35.0 g/dL    RDW 45.1 35.9 - 50.0 fL    Platelet Count 284 164 - 446 K/uL    MPV 9.5 9.0 - 12.9 fL    Neutrophils-Polys 51.60 " 44.00 - 72.00 %    Lymphocytes 36.80 22.00 - 41.00 %    Monocytes 8.70 0.00 - 13.40 %    Eosinophils 1.50 0.00 - 6.90 %    Basophils 1.20 0.00 - 1.80 %    Immature Granulocytes 0.20 0.00 - 0.90 %    Nucleated RBC 0.00 /100 WBC    Neutrophils (Absolute) 4.17 2.00 - 7.15 K/uL    Lymphs (Absolute) 2.97 1.00 - 4.80 K/uL    Monos (Absolute) 0.70 0.00 - 0.85 K/uL    Eos (Absolute) 0.12 0.00 - 0.51 K/uL    Baso (Absolute) 0.10 0.00 - 0.12 K/uL    Immature Granulocytes (abs) 0.02 0.00 - 0.11 K/uL    NRBC (Absolute) 0.00 K/uL   COMP METABOLIC PANEL   Result Value Ref Range    Sodium 139 135 - 145 mmol/L    Potassium 3.9 3.6 - 5.5 mmol/L    Chloride 105 96 - 112 mmol/L    Co2 26 20 - 33 mmol/L    Anion Gap 8.0 0.0 - 11.9    Glucose 88 65 - 99 mg/dL    Bun 16 8 - 22 mg/dL    Creatinine 0.81 0.50 - 1.40 mg/dL    Calcium 9.6 8.5 - 10.5 mg/dL    AST(SGOT) 12 12 - 45 U/L    ALT(SGPT) 12 2 - 50 U/L    Alkaline Phosphatase 56 30 - 99 U/L    Total Bilirubin 0.3 0.1 - 1.5 mg/dL    Albumin 4.6 3.2 - 4.9 g/dL    Total Protein 7.4 6.0 - 8.2 g/dL    Globulin 2.8 1.9 - 3.5 g/dL    A-G Ratio 1.6 g/dL   LIPASE   Result Value Ref Range    Lipase 24 11 - 82 U/L   HCG QUAL SERUM   Result Value Ref Range    Beta-Hcg Qualitative Serum Negative Negative   URINALYSIS,CULTURE IF INDICATED   Result Value Ref Range    Color Yellow     Character Cloudy (A)     Specific Gravity 1.016 <1.035    Ph 6.5 5.0 - 8.0    Glucose Negative Negative mg/dL    Ketones Negative Negative mg/dL    Protein Negative Negative mg/dL    Bilirubin Negative Negative    Urobilinogen, Urine 0.2 Negative    Nitrite Positive (A) Negative    Leukocyte Esterase Large (A) Negative    Occult Blood Negative Negative    Micro Urine Req Microscopic    URINE MICROSCOPIC (W/UA)   Result Value Ref Range    WBC 20-50 (A) /hpf    RBC 0-2 /hpf    Bacteria Many (A) None /hpf    Epithelial Cells Moderate (A) /hpf    Hyaline Cast 0-2 /lpf   ESTIMATED GFR   Result Value Ref Range    GFR If  "African American >60 >60 mL/min/1.73 m 2    GFR If Non African American >60 >60 mL/min/1.73 m 2       All labs reviewed by me.      RADIOLOGY  CT-ABDOMEN-PELVIS WITH   Final Result         1.  No acute abnormality.   2.  Irregular enhancing right ovarian cyst, appearance favors involuting cyst.   3.  Hepatomegaly        The radiologist's interpretation of all radiological studies have been reviewed by me.    COURSE & MEDICAL DECISION MAKING  Pertinent Labs & Imaging studies reviewed. (See chart for details)    10:42 PM - Patient seen and examined at bedside.         Patient noted to have slightly elevated blood pressure likely circumstantial secondary to presenting complaint. Referred to primary care physician for further evaluation.        Medical Decision Making: Patient is feeling much better after GI cocktail and 1 dose morphine Zofran CT shows no acute abnormalities she does have a urine consistent with urinary tract infection is given 1 dose of Rocephin here and discharged with 5-day supply of Bactrim.  Patient is given instructions return in 12 to 24 hours for ongoing abdominal pain sooner for worsening pain blood in emesis blood in stool any other acute symptoms or concerns she is also given a prescription for omeprazole 20 mg twice daily she instructed to take this twice daily for at least the next couple weeks and then she can reduce to 1 daily.  Is given referral to digestive health.  Patient's appreciative care understanding treatment plan discharged in stable and improved condition.    /70   Pulse 68   Temp 36.6 °C (97.9 °F) (Temporal)   Resp 16   Ht 1.575 m (5' 2\")   Wt 73 kg (161 lb)   SpO2 98%   BMI 29.45 kg/m²     [facility]  [address] [place] [id number] [phone]  Schedule an appointment as soon as possible for a visit       [facility], Emergency Dept  [address] [facility] [address] [phone]    in 12-24 hours if " symptoms persist,, immediately if symptoms worsen      Discharge Medication List as of 10/11/2019 12:31 AM      START taking these medications    Details   sulfamethoxazole-trimethoprim (BACTRIM DS) 800-160 MG tablet Take 1 Tab by mouth every 12 hours for 5 days., Disp-10 Tab, R-0, Print Rx Paper      omeprazole (PRILOSEC) 20 MG delayed-release capsule Take 1 Cap by mouth 2 times a day for 30 days., Disp-60 Cap, R-0, Print Rx Paper             FINAL IMPRESSION  1. Acute gastritis without hemorrhage, unspecified gastritis type    2. Acute cystitis without hematuria          This dictation has been created using voice recognition software and/or scribes. The accuracy of the dictation is limited by the abilities of the software and the expertise of the scribes. I expect there may be some errors of grammar and possibly content. I made every attempt to manually correct the errors within my dictation. However, errors related to voice recognition software and/or scribes may still exist and should be interpreted within the appropriate context.

## 2019-10-11 NOTE — ED NOTES
Patient awake alert and oriented x 4, Glasc[place] 15, bed in low position, call light within reach, on room air, attached to cardiac monitor, unlabored breathing noted, no cough noted, interacts with staff, interactions noted as appropriate, speaking with Dr. Figueredo at this time.

## 2019-10-12 LAB
BACTERIA UR CULT: ABNORMAL
BACTERIA UR CULT: ABNORMAL
SIGNIFICANT IND 70042: ABNORMAL
SITE SITE: ABNORMAL
SOURCE SOURCE: ABNORMAL

## 2019-10-14 NOTE — ED NOTES
ED Positive Culture Follow-up/Notification Note:    Date: 10/13/19     Patient seen in the ED on 10/10/2019 for abdominal pain x 2 days. Pt denied any fevers or chills. Pt reported one episode of emesis.     1. Acute gastritis without hemorrhage, unspecified gastritis type    2. Acute cystitis without hematuria       Discharge Medication List as of 10/11/2019 12:31 AM      START taking these medications    Details   sulfamethoxazole-trimethoprim (BACTRIM DS) 800-160 MG tablet Take 1 Tab by mouth every 12 hours for 5 days., Disp-10 Tab, R-0, Print Rx Paper      omeprazole (PRILOSEC) 20 MG delayed-release capsule Take 1 Cap by mouth 2 times a day for 30 days., Disp-60 Cap, R-0, Print Rx Paper           Medications given in the ED:  -Ceftriaxone 2 grams IV once  -GI cocktail PO once  -Morphine 4mg IV once  -Ondansetron 4mg IV once    Allergies: Other environmental     Vitals:    10/10/19 2330 10/10/19 2345 10/11/19 0000 10/11/19 0037   BP:   100/54 (!) 99/50   Pulse: 70 80 72 71   Resp:    16   Temp:       TempSrc:       SpO2: 98% 98% 96% 96%   Weight:       Height:           Final cultures:   Results     Procedure Component Value Units Date/Time    URINE CULTURE(NEW) [[id number]]  (Abnormal)  (Susceptibility) Collected:  10/10/19 2217    Order Status:  Completed Specimen:  Urine Updated:  10/12/19 0909     Significant Indicator POS     Source UR     Site -     Culture Result -      Escherichia coli  >100,000 cfu/mL      Susceptibility     Escherichia coli (1)     Antibiotic Interpretation Microscan Method Status    Ampicillin Sensitive <=8 mcg/mL SHARRI Final    Ceftriaxone Sensitive <=1 mcg/mL SHARRI Final    Ceftazidime Sensitive <=1 mcg/mL SHARRI Final    Cefotaxime Sensitive <=2 mcg/mL SHARRI Final    Cefazolin Sensitive <=2 mcg/mL SHARRI Final    Ciprofloxacin Sensitive <=1 mcg/mL SHARRI Final    Ampicillin/sulbactam Sensitive <=8/4 mcg/mL SHARRI Final    Cefepime Sensitive <=2 mcg/mL SHARRI Final    Tobramycin Sensitive <=4 mcg/mL SHARRI  Final    Cefotetan Sensitive <=16 mcg/mL SHARRI Final    Nitrofurantoin Sensitive <=32 mcg/mL SHARRI Final    Gentamicin Sensitive <=4 mcg/mL SHARRI Final    Levofloxacin Sensitive <=2 mcg/mL SHARRI Final    Pip/Tazobactam Sensitive <=16 mcg/mL SHARRI Final    Trimeth/Sulfa Sensitive <=2/38 mcg/mL SHARRI Final                   URINALYSIS,CULTURE IF INDICATED [[id number]]  (Abnormal) Collected:  10/10/19 2157    Order Status:  Completed Specimen:  Urine Updated:  10/10/19 2217     Color Yellow     Character Cloudy     Specific Gravity 1.016     Ph 6.5     Glucose Negative mg/dL      Ketones Negative mg/dL      Protein Negative mg/dL      Bilirubin Negative     Urobilinogen, Urine 0.2     Nitrite Positive     Leukocyte Esterase Large     Occult Blood Negative     Micro Urine Req Microscopic          Plan:   Organism is susceptible to prescribed antibiotic. Called pt to see how she was doing and to inform of results. Pt reported that she has still been experiencing some abdominal pain, but no fevers or urinary symptoms. Counseled pt to complete course of antibiotic therapy and to f/u with PCP if non-severe symptoms persist, or return to ED if symptoms worsen or severe symptoms such as fevers. Pt stated understanding and had no further questions.       Jroge Hoff, PharmD

## 2019-12-10 ENCOUNTER — APPOINTMENT (OUTPATIENT)
Dept: RADIOLOGY | Facility: MEDICAL CENTER | Age: 29
End: 2019-12-10
Attending: EMERGENCY MEDICINE
Payer: MEDICAID

## 2019-12-10 ENCOUNTER — HOSPITAL ENCOUNTER (EMERGENCY)
Facility: MEDICAL CENTER | Age: 29
End: 2019-12-10
Attending: EMERGENCY MEDICINE
Payer: MEDICAID

## 2019-12-10 VITALS
DIASTOLIC BLOOD PRESSURE: 64 MMHG | HEART RATE: 73 BPM | OXYGEN SATURATION: 95 % | HEIGHT: 62 IN | WEIGHT: 169.09 LBS | RESPIRATION RATE: 18 BRPM | SYSTOLIC BLOOD PRESSURE: 131 MMHG | BODY MASS INDEX: 31.12 KG/M2 | TEMPERATURE: 97.7 F

## 2019-12-10 DIAGNOSIS — J06.9 UPPER RESPIRATORY TRACT INFECTION, UNSPECIFIED TYPE: ICD-10-CM

## 2019-12-10 DIAGNOSIS — J98.01 BRONCHOSPASM: ICD-10-CM

## 2019-12-10 LAB
FLUAV RNA SPEC QL NAA+PROBE: NEGATIVE
FLUBV RNA SPEC QL NAA+PROBE: NEGATIVE

## 2019-12-10 PROCEDURE — 87502 INFLUENZA DNA AMP PROBE: CPT

## 2019-12-10 PROCEDURE — 94640 AIRWAY INHALATION TREATMENT: CPT

## 2019-12-10 PROCEDURE — 71045 X-RAY EXAM CHEST 1 VIEW: CPT

## 2019-12-10 PROCEDURE — 700101 HCHG RX REV CODE 250: Performed by: EMERGENCY MEDICINE

## 2019-12-10 PROCEDURE — 99284 EMERGENCY DEPT VISIT MOD MDM: CPT

## 2019-12-10 RX ORDER — AZITHROMYCIN 250 MG/1
250 TABLET, FILM COATED ORAL DAILY
COMMUNITY
End: 2021-02-09

## 2019-12-10 RX ORDER — PREDNISONE 20 MG/1
20 TABLET ORAL DAILY
COMMUNITY
End: 2021-02-09

## 2019-12-10 RX ORDER — IPRATROPIUM BROMIDE AND ALBUTEROL SULFATE 2.5; .5 MG/3ML; MG/3ML
3 SOLUTION RESPIRATORY (INHALATION)
Status: COMPLETED | OUTPATIENT
Start: 2019-12-10 | End: 2019-12-10

## 2019-12-10 RX ORDER — PROMETHAZINE HYDROCHLORIDE AND CODEINE PHOSPHATE 6.25; 1 MG/5ML; MG/5ML
5 SYRUP ORAL 4 TIMES DAILY PRN
COMMUNITY
End: 2021-02-09

## 2019-12-10 RX ORDER — BENZONATATE 100 MG/1
100 CAPSULE ORAL 3 TIMES DAILY PRN
COMMUNITY
End: 2021-02-09

## 2019-12-10 RX ADMIN — IPRATROPIUM BROMIDE AND ALBUTEROL SULFATE 3 ML: .5; 3 SOLUTION RESPIRATORY (INHALATION) at 08:46

## 2019-12-10 NOTE — ED TRIAGE NOTES
Chief Complaint   Patient presents with   • Cough     X 2wks   pt reports recently dx with pneumonia and she is not getting better with medication increased coughing episodes. Pt NAD she is PWD

## 2019-12-10 NOTE — ED PROVIDER NOTES
1 cadniadteED Provider Note    Scribed for Oscar Martin M.D. by Oscar Dukes. 12/10/2019, 8:30 AM.    Primary care provider: Gaetano Keller M.D.  Means of arrival: Walk in  History obtained from: Patient  History limited by: None    CHIEF COMPLAINT  Chief Complaint   Patient presents with   • Cough     X 2wks       HPI  Natalya Salguero is a 29 y.o. female who presents to the Emergency Department for evaluation of worsening cough onset 14 days. She reports she was seen at [facility], where she was diagnosed with pneumonia. She however states she did not receive an x-ray or flu test. She admits to additional symptoms of moderate chest pain when coughing. She adds that she had flu like symptoms on 12/8/19, including vomiting, fever and diarrhea, all of which are resolved. She says she has not smoked much over this last week and a half. She had a breathing treatment this morning.     REVIEW OF SYSTEMS  Pertinent positives include cough, chest pain when coughing.  As above, all other systems reviewed and are negative.   See HPI for further details.     PAST MEDICAL HISTORY   has a past medical history of Anemia, Anesthesia, Anxiety, Depression, Gastric ulcer, chronic, Pregnancy, Psychiatric problem, Ulcer (2009), and Ulcer (09/2009).    SURGICAL HISTORY   has a past surgical history that includes other abdominal surgery (2009); repeat c section (9/14/2017); laparoscopy (3/22/2010); abdominal exploration (09/2009); and primary c section (11/19/2012).    SOCIAL HISTORY  Social History     Tobacco Use   • Smoking status: Current Every Day Smoker     Packs/day: 0.12     Years: 6.00     Pack years: 0.72     Types: Cigarettes   • Smokeless tobacco: Never Used   Substance Use Topics   • Alcohol use: Yes     Alcohol/week: 0.0 oz     Comment: Socially   • Drug use: No      Social History     Substance and Sexual Activity   Drug Use No       FAMILY HISTORY  Family History   Problem Relation Age of  "Onset   • Diabetes Mother    • Hyperlipidemia Mother    • Hypertension Mother    • Heart Disease Mother    • Hyperlipidemia Father    • Cancer Maternal Grandmother         lung cancer       CURRENT MEDICATIONS  Home Medications     Reviewed by Kristin Khan R.N. (Registered Nurse) on 12/10/19 at 0756  Med List Status: Partial   Medication Last Dose Status   albuterol (PROVENTIL) 2.5 mg/0.5 mL Nebu Soln  Active   azithromycin (ZITHROMAX) 250 MG Tab  Active   benzonatate (TESSALON) 100 MG Cap  Active   predniSONE (DELTASONE) 20 MG Tab  Active   promethazine-codeine (PHENERGAN-CODEINE) 6.25-10 MG/5ML Syrup  Active                ALLERGIES  Allergies   Allergen Reactions   • Other Environmental      Adhesive tape causes blisters       PHYSICAL EXAM  VITAL SIGNS: /64   Pulse 65   Temp 36.5 °C (97.7 °F) (Temporal)   Resp 18   Ht 1.575 m (5' 2\")   Wt 76.7 kg (169 lb 1.5 oz)   LMP 11/23/2019   SpO2 97%   BMI 30.93 kg/m²   Vitals reviewed.  Constitutional: Alert in no apparent distress.  HENT: No signs of trauma, Bilateral external ears normal, Nose normal.   Eyes: Pupils are equal and reactive, Conjunctiva normal, Non-icteric.   Neck: Normal range of motion, No tenderness, Supple, No stridor.   Lymphatic: No lymphadenopathy noted.   Cardiovascular: Regular rate and rhythm, no murmurs.   Thorax & Lungs: Scattered wheezes, coughing. No chest tenderness.   Abdomen: Bowel sounds normal, Soft, No tenderness, No peritoneal signs, No masses, No pulsatile masses.   Skin: Warm, Dry, No erythema, No rash.   Back: No bony tenderness, No CVA tenderness.   Extremities: Intact distal pulses, No edema, No tenderness, No cyanosis  Musculoskeletal: Good range of motion in all major joints. No tenderness to palpation or major deformities noted.   Neurologic: Alert , Normal motor function, Normal sensory function, No focal deficits noted.   Psychiatric: Affect normal, Judgment normal, Mood normal.     DIAGNOSTIC STUDIES / " PROCEDURES    LABS  Labs Reviewed   INFLUENZA A/B BY PCR   Negative    All labs reviewed by me.    RADIOLOGY  DX-CHEST-PORTABLE (1 VIEW)   Final Result      No acute cardiopulmonary disease.        The radiologist's interpretation of all radiological studies have been reviewed by me.    COURSE & MEDICAL DECISION MAKING  Nursing notes, VS, PMSFHx reviewed in chart.  Differential diagnoses include but not limited to: Pneumonina, influenza, COPD.      Obtained and reviewed past medical records which indicated the patient has had a cough for 14 days. She was diagnosed with pneumonia and prescribed zithromax, albuterol and prednisone, Phenergan with codein and tessalon pearls. No x-rays available to review. She has history of smoking.     8:30 AM Patient seen and examined at bedside. I informed the patient of my plan to run diagnostic studies to evaluate their symptoms including chest x-ray and influenza test. Patient verbalizes understanding and support with my plan of care. Ordered for DX-Chest, Influenza A/B by PCR to evaluate. Patient will be treated with Duoneb nebulizer solution for her symptoms.      10:21 AM - I reevaluated the patient at bedside. The patient informs me they feel improved following Duoneb nebulizer solution administration. I discussed the patient's diagnostic study results which show no pneumonia.  The patient's influenza test is negative.  The patient verbalizes she feels comfortable going home. The patient is stable for discharge at this time and will return for any new or worsening symptoms. I discussed plan for discharge and follow up as outlined below. Patient verbalizes understanding and support with my plan for discharge. I encouraged the patient to quit smoking an vaping.     The patient will not drink alcohol nor drive with prescribed medications. The patient will return for new or worsening symptoms and is stable at the time of discharge.    The patient is referred to a primary  physician for blood pressure management, diabetic screening, and for all other preventative health concerns.    DISPOSITION:  Patient will be discharged home in stable condition.    FOLLOW UP:  [facility], Emergency Dept  [address] [id number] [phone]    If symptoms worsen    Gaetano Keller M.D.  [address]  [address]  [facility] [id number] [phone]      As needed    FINAL IMPRESSION  1. Upper respiratory tract infection, unspecified type    2. Bronchospasm          Oscar KATZ (Scribtod), am scribing for, and in the presence of, Oscar Martin M.D..    Electronically signed by: Oscar Dukes (Saqib), 12/10/2019. Oscar BOWENS M.D. personally performed the services described in this documentation, as scribed by Oscar Dukes in my presence, and it is both accurate and complete.    The note accurately reflects work and decisions made by me.  Oscar Martin  12/10/2019  4:12 PM

## 2019-12-10 NOTE — ED NOTES
Verbalizes understanding of discharge and followup instructions. VSS. Ambulatory to discharge with steady gait.

## 2020-02-28 ENCOUNTER — OFFICE VISIT (OUTPATIENT)
Dept: MEDICAL GROUP | Facility: MEDICAL CENTER | Age: 30
End: 2020-02-28
Attending: FAMILY MEDICINE
Payer: MEDICAID

## 2020-02-28 VITALS
WEIGHT: 184 LBS | SYSTOLIC BLOOD PRESSURE: 114 MMHG | BODY MASS INDEX: 33.86 KG/M2 | RESPIRATION RATE: 16 BRPM | DIASTOLIC BLOOD PRESSURE: 62 MMHG | TEMPERATURE: 98.7 F | HEIGHT: 62 IN | OXYGEN SATURATION: 98 % | HEART RATE: 72 BPM

## 2020-02-28 DIAGNOSIS — F41.0 PANIC ANXIETY SYNDROME: ICD-10-CM

## 2020-02-28 DIAGNOSIS — F32.1 CURRENT MODERATE EPISODE OF MAJOR DEPRESSIVE DISORDER, UNSPECIFIED WHETHER RECURRENT (HCC): ICD-10-CM

## 2020-02-28 DIAGNOSIS — E66.9 OBESITY (BMI 30-39.9): ICD-10-CM

## 2020-02-28 DIAGNOSIS — R53.83 OTHER FATIGUE: ICD-10-CM

## 2020-02-28 DIAGNOSIS — K21.9 CHRONIC GERD: ICD-10-CM

## 2020-02-28 PROCEDURE — 99214 OFFICE O/P EST MOD 30 MIN: CPT | Performed by: FAMILY MEDICINE

## 2020-02-28 PROCEDURE — 99213 OFFICE O/P EST LOW 20 MIN: CPT | Performed by: FAMILY MEDICINE

## 2020-02-28 RX ORDER — HYDROXYZINE HYDROCHLORIDE 25 MG/1
25 TABLET, FILM COATED ORAL 3 TIMES DAILY PRN
Qty: 60 TAB | Refills: 0 | Status: SHIPPED | OUTPATIENT
Start: 2020-02-28 | End: 2020-03-27 | Stop reason: SDUPTHER

## 2020-02-28 RX ORDER — SERTRALINE HYDROCHLORIDE 100 MG/1
100 TABLET, FILM COATED ORAL DAILY
Qty: 30 TAB | Refills: 6 | Status: SHIPPED | OUTPATIENT
Start: 2020-02-28 | End: 2021-11-16 | Stop reason: SDUPTHER

## 2020-02-28 RX ORDER — BUSPIRONE HYDROCHLORIDE 5 MG/1
5 TABLET ORAL 3 TIMES DAILY
Qty: 90 TAB | Refills: 6 | Status: SHIPPED | OUTPATIENT
Start: 2020-02-28 | End: 2021-11-16 | Stop reason: SDUPTHER

## 2020-02-28 RX ORDER — OMEPRAZOLE 20 MG/1
20 CAPSULE, DELAYED RELEASE ORAL DAILY
Qty: 30 CAP | Refills: 11 | Status: SHIPPED | OUTPATIENT
Start: 2020-02-28

## 2020-02-28 ASSESSMENT — FIBROSIS 4 INDEX: FIB4 SCORE: 0.35
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
## 2020-02-29 NOTE — ASSESSMENT & PLAN NOTE
Patient was noted at age 22 to have perforated bleeding gastric ulcers.  She is currently taking 20 mg of omeprazole over-the-counter.  She reports resolution of recent epigastric pain and substernal burning using that medication over the past 4 to 6 weeks.
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
## 2020-02-29 NOTE — ASSESSMENT & PLAN NOTE
Patient notes increased level of fatigue over over the past 9 months.  She reports normal food intake with no unexplained emesis or diarrhea.  Sleep is slightly reduced at about 6 hours.  She has no past history of hypothyroidism.  Not reporting unexplained hair loss or cold intolerance

## 2020-02-29 NOTE — ASSESSMENT & PLAN NOTE
Patient is concerned with recent weight gain.  She denies hair loss or cold intolerance.  She does drink 2 vegetable fruit juice drinks but each 1 is about 170 irma/can.  She denies routine intake of desserts.  She does report probably using food as a refuge recently due to increased stress.

## 2020-03-01 ENCOUNTER — HOSPITAL ENCOUNTER (EMERGENCY)
Facility: MEDICAL CENTER | Age: 30
End: 2020-03-01
Attending: EMERGENCY MEDICINE
Payer: MEDICAID

## 2020-03-01 VITALS
TEMPERATURE: 99.3 F | DIASTOLIC BLOOD PRESSURE: 64 MMHG | OXYGEN SATURATION: 94 % | HEIGHT: 62 IN | SYSTOLIC BLOOD PRESSURE: 100 MMHG | BODY MASS INDEX: 33.35 KG/M2 | RESPIRATION RATE: 16 BRPM | WEIGHT: 181.22 LBS | HEART RATE: 73 BPM

## 2020-03-01 DIAGNOSIS — R19.7 DIARRHEA OF PRESUMED INFECTIOUS ORIGIN: ICD-10-CM

## 2020-03-01 DIAGNOSIS — R11.2 NAUSEA AND VOMITING, INTRACTABILITY OF VOMITING NOT SPECIFIED, UNSPECIFIED VOMITING TYPE: ICD-10-CM

## 2020-03-01 LAB
ALBUMIN SERPL BCP-MCNC: 4.6 G/DL (ref 3.2–4.9)
ALBUMIN/GLOB SERPL: 1.5 G/DL
ALP SERPL-CCNC: 52 U/L (ref 30–99)
ALT SERPL-CCNC: 17 U/L (ref 2–50)
ANION GAP SERPL CALC-SCNC: 10 MMOL/L (ref 0–11.9)
APPEARANCE UR: ABNORMAL
AST SERPL-CCNC: 15 U/L (ref 12–45)
BACTERIA #/AREA URNS HPF: ABNORMAL /HPF
BASOPHILS # BLD AUTO: 0.3 % (ref 0–1.8)
BASOPHILS # BLD: 0.04 K/UL (ref 0–0.12)
BILIRUB SERPL-MCNC: 0.6 MG/DL (ref 0.1–1.5)
BILIRUB UR QL STRIP.AUTO: NEGATIVE
BUN SERPL-MCNC: 19 MG/DL (ref 8–22)
CALCIUM SERPL-MCNC: 8.8 MG/DL (ref 8.5–10.5)
CHLORIDE SERPL-SCNC: 106 MMOL/L (ref 96–112)
CO2 SERPL-SCNC: 22 MMOL/L (ref 20–33)
COLOR UR: YELLOW
CREAT SERPL-MCNC: 0.65 MG/DL (ref 0.5–1.4)
EOSINOPHIL # BLD AUTO: 0.12 K/UL (ref 0–0.51)
EOSINOPHIL NFR BLD: 1 % (ref 0–6.9)
EPI CELLS #/AREA URNS HPF: ABNORMAL /HPF
ERYTHROCYTE [DISTWIDTH] IN BLOOD BY AUTOMATED COUNT: 43.6 FL (ref 35.9–50)
GLOBULIN SER CALC-MCNC: 3.1 G/DL (ref 1.9–3.5)
GLUCOSE SERPL-MCNC: 97 MG/DL (ref 65–99)
GLUCOSE UR STRIP.AUTO-MCNC: NEGATIVE MG/DL
HCG SERPL QL: NEGATIVE
HCT VFR BLD AUTO: 44.6 % (ref 37–47)
HGB BLD-MCNC: 14.5 G/DL (ref 12–16)
HYALINE CASTS #/AREA URNS LPF: ABNORMAL /LPF
IMM GRANULOCYTES # BLD AUTO: 0.05 K/UL (ref 0–0.11)
IMM GRANULOCYTES NFR BLD AUTO: 0.4 % (ref 0–0.9)
KETONES UR STRIP.AUTO-MCNC: NEGATIVE MG/DL
LEUKOCYTE ESTERASE UR QL STRIP.AUTO: NEGATIVE
LIPASE SERPL-CCNC: 13 U/L (ref 11–82)
LYMPHOCYTES # BLD AUTO: 0.71 K/UL (ref 1–4.8)
LYMPHOCYTES NFR BLD: 5.7 % (ref 22–41)
MCH RBC QN AUTO: 29.9 PG (ref 27–33)
MCHC RBC AUTO-ENTMCNC: 32.5 G/DL (ref 33.6–35)
MCV RBC AUTO: 92 FL (ref 81.4–97.8)
MICRO URNS: ABNORMAL
MONOCYTES # BLD AUTO: 0.48 K/UL (ref 0–0.85)
MONOCYTES NFR BLD AUTO: 3.9 % (ref 0–13.4)
NEUTROPHILS # BLD AUTO: 10.96 K/UL (ref 2–7.15)
NEUTROPHILS NFR BLD: 88.7 % (ref 44–72)
NITRITE UR QL STRIP.AUTO: NEGATIVE
NRBC # BLD AUTO: 0 K/UL
NRBC BLD-RTO: 0 /100 WBC
PH UR STRIP.AUTO: 5 [PH] (ref 5–8)
PLATELET # BLD AUTO: 236 K/UL (ref 164–446)
PMV BLD AUTO: 9.6 FL (ref 9–12.9)
POTASSIUM SERPL-SCNC: 3.6 MMOL/L (ref 3.6–5.5)
PROT SERPL-MCNC: 7.7 G/DL (ref 6–8.2)
PROT UR QL STRIP: NEGATIVE MG/DL
RBC # BLD AUTO: 4.85 M/UL (ref 4.2–5.4)
RBC # URNS HPF: ABNORMAL /HPF
RBC UR QL AUTO: ABNORMAL
SODIUM SERPL-SCNC: 138 MMOL/L (ref 135–145)
SP GR UR STRIP.AUTO: 1.02
UROBILINOGEN UR STRIP.AUTO-MCNC: 0.2 MG/DL
WBC # BLD AUTO: 12.4 K/UL (ref 4.8–10.8)
WBC #/AREA URNS HPF: ABNORMAL /HPF

## 2020-03-01 PROCEDURE — 84703 CHORIONIC GONADOTROPIN ASSAY: CPT

## 2020-03-01 PROCEDURE — 36415 COLL VENOUS BLD VENIPUNCTURE: CPT

## 2020-03-01 PROCEDURE — 99285 EMERGENCY DEPT VISIT HI MDM: CPT

## 2020-03-01 PROCEDURE — 700111 HCHG RX REV CODE 636 W/ 250 OVERRIDE (IP): Performed by: EMERGENCY MEDICINE

## 2020-03-01 PROCEDURE — 85025 COMPLETE CBC W/AUTO DIFF WBC: CPT

## 2020-03-01 PROCEDURE — 80053 COMPREHEN METABOLIC PANEL: CPT

## 2020-03-01 PROCEDURE — 96374 THER/PROPH/DIAG INJ IV PUSH: CPT

## 2020-03-01 PROCEDURE — 96376 TX/PRO/DX INJ SAME DRUG ADON: CPT

## 2020-03-01 PROCEDURE — 96375 TX/PRO/DX INJ NEW DRUG ADDON: CPT

## 2020-03-01 PROCEDURE — 81001 URINALYSIS AUTO W/SCOPE: CPT

## 2020-03-01 PROCEDURE — 700105 HCHG RX REV CODE 258: Performed by: EMERGENCY MEDICINE

## 2020-03-01 PROCEDURE — 83690 ASSAY OF LIPASE: CPT

## 2020-03-01 RX ORDER — MORPHINE SULFATE 4 MG/ML
4 INJECTION, SOLUTION INTRAMUSCULAR; INTRAVENOUS ONCE
Status: COMPLETED | OUTPATIENT
Start: 2020-03-01 | End: 2020-03-01

## 2020-03-01 RX ORDER — ONDANSETRON 2 MG/ML
4 INJECTION INTRAMUSCULAR; INTRAVENOUS ONCE
Status: COMPLETED | OUTPATIENT
Start: 2020-03-01 | End: 2020-03-01

## 2020-03-01 RX ORDER — DICYCLOMINE HCL 20 MG
20 TABLET ORAL EVERY 6 HOURS
Qty: 20 TAB | Refills: 0 | Status: SHIPPED | OUTPATIENT
Start: 2020-03-01 | End: 2021-02-09

## 2020-03-01 RX ORDER — SODIUM CHLORIDE, SODIUM LACTATE, POTASSIUM CHLORIDE, CALCIUM CHLORIDE 600; 310; 30; 20 MG/100ML; MG/100ML; MG/100ML; MG/100ML
1000 INJECTION, SOLUTION INTRAVENOUS ONCE
Status: COMPLETED | OUTPATIENT
Start: 2020-03-01 | End: 2020-03-01

## 2020-03-01 RX ORDER — ONDANSETRON 4 MG/1
4 TABLET, ORALLY DISINTEGRATING ORAL EVERY 6 HOURS PRN
Qty: 15 TAB | Refills: 0 | Status: SHIPPED | OUTPATIENT
Start: 2020-03-01 | End: 2021-02-09

## 2020-03-01 RX ADMIN — ONDANSETRON 4 MG: 2 INJECTION INTRAMUSCULAR; INTRAVENOUS at 14:11

## 2020-03-01 RX ADMIN — SODIUM CHLORIDE, POTASSIUM CHLORIDE, SODIUM LACTATE AND CALCIUM CHLORIDE 1000 ML: 600; 310; 30; 20 INJECTION, SOLUTION INTRAVENOUS at 12:30

## 2020-03-01 RX ADMIN — MORPHINE SULFATE 4 MG: 4 INJECTION INTRAVENOUS at 12:51

## 2020-03-01 RX ADMIN — ONDANSETRON 4 MG: 2 INJECTION INTRAMUSCULAR; INTRAVENOUS at 12:50

## 2020-03-01 ASSESSMENT — LIFESTYLE VARIABLES: DOES PATIENT WANT TO STOP DRINKING: NO

## 2020-03-01 ASSESSMENT — FIBROSIS 4 INDEX: FIB4 SCORE: 0.35

## 2020-03-01 NOTE — ED NOTES
Pt ambulates to triage with c/c n/v & blood in vomit that started this AM.  A&ox4.  Pt to lobby & advised to inform RN of any changes.

## 2020-03-01 NOTE — ED NOTES
Pt. Discharged at this time, pt. Wheeled to ED entrance with wheelchair, pt. Provided with discharge prescription, paperwork and teaching. Pt. Son accompanying patient. Pt. Wheeled out of ED at this time.

## 2020-03-01 NOTE — DISCHARGE INSTRUCTIONS
Please take Bentyl for bowel cramping, drink plenty of fluids, and Zofran for nausea, contact precautions.  This does not appear to be a presentation consistent with gallbladder disease or appendicitis however please return to the emergency department in the next 12 to 24 hours if your symptoms progress in spite of medication

## 2020-03-01 NOTE — ED PROVIDER NOTES
ED Provider Note    CHIEF COMPLAINT  Chief Complaint   Patient presents with   • N/V     started this AM   • Blood in Vomit       HPI  Natalya Salguero is a 29 y.o. female who presents with a report of nausea, vomiting, diarrhea that is explosive but without cheli blood that started this morning.  There is been no reported sick contacts.  She has diffuse abdominal discomfort.  Does have a history of GERD and anxiety.  Previously had a perforated ulcer.  No other complaints in the pain seems to be well localized nonradiating and diffuse in character and location    REVIEW OF SYSTEMS  See HPI for further details. All other systems are negative.     PAST MEDICAL HISTORY  Past Medical History:   Diagnosis Date   • Anemia     Postpartum after 2    • Anesthesia     anxiety when blue sheet covered face during c section   • Anxiety    • Chronic GERD 2020   • Depression    • Gastric ulcer, chronic    • Pregnancy    • Psychiatric problem     anxiety   • Ulcer    • Ulcer 2009    perforated ulcer       FAMILY HISTORY  Family History   Problem Relation Age of Onset   • Diabetes Mother    • Hyperlipidemia Mother    • Hypertension Mother    • Heart Disease Mother    • Hyperlipidemia Father    • Cancer Maternal Grandmother         lung cancer       SOCIAL HISTORY   reports that she has been smoking cigarettes. She has a 0.72 pack-year smoking history. She has never used smokeless tobacco. She reports current alcohol use. She reports that she does not use drugs.    SURGICAL HISTORY  Past Surgical History:   Procedure Laterality Date   • REPEAT C SECTION  2017    Procedure: REPEAT C SECTION;  Surgeon: Thor Joseph M.D.;  Location: LABOR AND DELIVERY;  Service: Labor and Delivery   • PRIMARY C SECTION  2012    Performed by Denise Winslow M.D. at LABOR AND DELIVERY   • LAPAROSCOPY  3/22/2010    Performed by SAMANTHA ROCHA at SURGERY [facility]   • ABDOMINAL EXPLORATION  2009     "perforated stomach due to ulcers skin graft done.   • OTHER ABDOMINAL SURGERY  2009    ulcer       CURRENT MEDICATIONS  Home Medications    **Home medications have not yet been reviewed for this encounter**         ALLERGIES  Allergies   Allergen Reactions   • Other Environmental      Adhesive tape causes blisters       PHYSICAL EXAM  VITAL SIGNS: /57   Pulse 83   Temp 36 °C (96.8 °F) (Temporal)   Resp 18   Ht 1.575 m (5' 2\")   Wt 82.2 kg (181 lb 3.5 oz)   LMP 03/01/2020   SpO2 97%   BMI 33.15 kg/m²    Constitutional: Well developed, Well nourished, No acute distress, Non-toxic appearance.   HENT: Normocephalic, Atraumatic, Bilateral external ears normal, Oropharynx is clear mucous membranes are moist. No oral exudates or nasal discharge.   Eyes: Pupils are equal round and reactive, EOMI, Conjunctiva normal, No discharge.   Neck: Normal range of motion, No tenderness, Supple, No stridor. No meningismus.  Lymphatic: No lymphadenopathy noted.   Cardiovascular: Regular rate and rhythm without murmur rub or gallop.  Thorax & Lungs: Clear breath sounds bilaterally without wheezes, rhonchi or rales. There is no chest wall tenderness.   Abdomen: Soft with diffuse tenderness, right side greater than left, there is no rebound or guarding. No organomegaly is appreciated. Bowel sounds are hyperactive.  Skin: Normal without rash.   Back: No CVA or spinal tenderness.   Extremities: Intact distal pulses, No edema, No tenderness, No cyanosis, No clubbing. Capillary refill is less than 2 seconds.  Musculoskeletal: Good range of motion in all major joints. No tenderness to palpation or major deformities noted.   Neurologic: Alert & oriented x 3, Normal motor function, Normal sensory function, No focal deficits noted. Reflexes are normal.  Psychiatric: Affect normal, Judgment normal, Mood normal. There is no suicidal ideation or patient reported hallucinations.       COURSE & MEDICAL DECISION MAKING  Pertinent Labs & " Imaging studies reviewed. (See chart for details)  Patient comes in with fairly normal vital signs and symptomatology and presentation consistent with a gastroenteritis.  Examination does not point towards surgical process.    I kept the patient n.p.o. initially and gave her Zofran as well as morphine for bowel cramping.  This controlled her symptoms and after 1 L of lactated Ringer's her pulse came down and she began to improve.    Laboratory evaluation reveals mild leukocytosis of 12,400 with an 89% PMN shift.  No electrolyte derangements or acidosis is seen.  Urinalysis shows no evidence of infection    Reexamination of the abdomen shows that she does not have a significant change.  I still have significant doubts about this being a surgical process given her presentation    I feel comfortable giving her oral challenge which she did well on.  She is discharged with Bentyl for bowel cramping pneumonia and Zofran for nausea and understands this is a highly contagious illness and to do frequent handwashing to avoid having her kids get this illness    Patient is comfortable this plan of care discharged in stable condition will return if any significant change in symptoms in the next 12 to 24 hours    FINAL IMPRESSION  1. Nausea and vomiting, intractability of vomiting not specified, unspecified vomiting type    2. Diarrhea of presumed infectious origin             Electronically signed by: Wade Perez M.D., 3/1/2020 2:55 PM

## 2020-03-01 NOTE — ED NOTES
Break RN:  Urine collected & sent to lab.  Pt medicated per Md's orders.  On spo2.  Call light within reach.  Son at bedside.

## 2020-03-09 ENCOUNTER — TELEPHONE (OUTPATIENT)
Dept: MEDICAL GROUP | Facility: MEDICAL CENTER | Age: 30
End: 2020-03-09

## 2020-03-10 NOTE — TELEPHONE ENCOUNTER
DOCUMENTATION OF PAR STATUS:    1. Name of Medication & Dose: Omeprazole 20 mg     2. Name of Prescription Coverage Company & phone #: [place] [employer]    3. Date Prior Auth Submitted: 03*09/2020    4. What information was given to obtain insurance decision? Chart notes, diagnosis codes, medication history    5. Prior Auth Status? Pending    6. Patient Notified: no

## 2020-03-16 ENCOUNTER — TELEPHONE (OUTPATIENT)
Dept: MEDICAL GROUP | Facility: MEDICAL CENTER | Age: 30
End: 2020-03-16

## 2020-03-19 ENCOUNTER — TELEPHONE (OUTPATIENT)
Dept: MEDICAL GROUP | Facility: MEDICAL CENTER | Age: 30
End: 2020-03-19

## 2020-03-20 ENCOUNTER — TELEPHONE (OUTPATIENT)
Dept: MEDICAL GROUP | Facility: MEDICAL CENTER | Age: 30
End: 2020-03-20

## 2020-03-20 NOTE — TELEPHONE ENCOUNTER
DOCUMENTATION OF PAR STATUS:    1. Name of Medication & Dose: Omeprazole 20 mg    2. Name of Prescription Coverage Company & phone #: [place] [employer]    3. Date Prior Auth Submitted: 03/20/2020    4. What information was given to obtain insurance decision? Chart notes, med history, dx codes    5. Prior Auth Status? Pending    6. Patient Notified:no

## 2020-03-26 ENCOUNTER — TELEPHONE (OUTPATIENT)
Dept: MEDICAL GROUP | Facility: MEDICAL CENTER | Age: 30
End: 2020-03-26

## 2020-03-26 NOTE — TELEPHONE ENCOUNTER
1. Name: Natalya Salguero  Call Back Number: [phone] (home)    How would the patient prefer to be contacted with a response: Phone call OK to leave a detailed message    2. Which medication(s) is being requested?   hydrOXYzine HCl (ATARAX) 25 MG Tab    3. What is the preferred Pharmacy?   [facility]    Patient was informed they may receive a return phone call from our office with any additional questions before processing this request.

## 2020-03-27 DIAGNOSIS — F41.9 ANXIETY: ICD-10-CM

## 2020-03-27 RX ORDER — HYDROXYZINE HYDROCHLORIDE 25 MG/1
25 TABLET, FILM COATED ORAL 3 TIMES DAILY PRN
Qty: 60 TAB | Refills: 2 | Status: SHIPPED | OUTPATIENT
Start: 2020-03-27 | End: 2021-10-20

## 2020-03-27 NOTE — TELEPHONE ENCOUNTER
Received request via: Pharmacy    Was the patient seen in the last year in this department? Yes  Future Appointments       Provider Department [place]    4/24/2020 9:20 AM Gaetano Keller M.D. [facility]          Does the patient have an active prescription (recently filled or refills available) for medication(s) requested? No

## 2020-08-25 ENCOUNTER — TELEPHONE (OUTPATIENT)
Dept: MEDICAL GROUP | Facility: MEDICAL CENTER | Age: 30
End: 2020-08-25

## 2020-08-26 NOTE — TELEPHONE ENCOUNTER
If pain worsens or develops purulent foul smelling drainage, hot to touch, redness, fever go to UC  Clean with a mild soap, keep clean and dry.

## 2020-08-26 NOTE — TELEPHONE ENCOUNTER
1. Caller Name: Natalya                        Call Back Number: [phone] (home)       How would the patient prefer to be contacted with a response: Phone call OK to leave a detailed message    2. What are the patient's symptoms (location & severity)? Acrylic nail ripped off, painful    3. Is this a new symptom Yes    4. When did it start? A few days ago    5. Action taken per Active Symptom Guide: Appointment scheduled for Friday. This is when pt can come in due to work    6. Patient agrees to recommended action per Active Symptom Escalation Protocol.

## 2020-08-31 ENCOUNTER — TELEPHONE (OUTPATIENT)
Dept: MEDICAL GROUP | Facility: MEDICAL CENTER | Age: 30
End: 2020-08-31

## 2020-09-01 ENCOUNTER — PATIENT MESSAGE (OUTPATIENT)
Dept: MEDICAL GROUP | Facility: MEDICAL CENTER | Age: 30
End: 2020-09-01

## 2020-09-16 ENCOUNTER — PATIENT MESSAGE (OUTPATIENT)
Dept: MEDICAL GROUP | Facility: MEDICAL CENTER | Age: 30
End: 2020-09-16

## 2020-09-22 ENCOUNTER — OFFICE VISIT (OUTPATIENT)
Dept: MEDICAL GROUP | Facility: MEDICAL CENTER | Age: 30
End: 2020-09-22
Attending: FAMILY MEDICINE
Payer: MEDICAID

## 2020-09-22 VITALS
WEIGHT: 194 LBS | TEMPERATURE: 99 F | RESPIRATION RATE: 16 BRPM | OXYGEN SATURATION: 97 % | SYSTOLIC BLOOD PRESSURE: 122 MMHG | HEART RATE: 56 BPM | HEIGHT: 62 IN | BODY MASS INDEX: 35.7 KG/M2 | DIASTOLIC BLOOD PRESSURE: 76 MMHG

## 2020-09-22 DIAGNOSIS — F41.9 ANXIETY: ICD-10-CM

## 2020-09-22 DIAGNOSIS — E66.9 OBESITY (BMI 30-39.9): ICD-10-CM

## 2020-09-22 PROCEDURE — 99213 OFFICE O/P EST LOW 20 MIN: CPT | Performed by: FAMILY MEDICINE

## 2020-09-22 RX ORDER — ACYCLOVIR 800 MG/1
TABLET ORAL
COMMUNITY
Start: 2020-09-09 | End: 2021-02-09

## 2020-09-22 RX ORDER — PHENTERMINE HYDROCHLORIDE 37.5 MG/1
37.5 CAPSULE ORAL EVERY MORNING
Qty: 30 CAP | Refills: 0 | Status: SHIPPED | OUTPATIENT
Start: 2020-09-22 | End: 2020-10-22

## 2020-09-22 ASSESSMENT — FIBROSIS 4 INDEX: FIB4 SCORE: 0.45

## 2020-09-22 NOTE — PROGRESS NOTES
"Subjective:      Natalya Salguero is a 29 y.o. female who presents with No chief complaint on file.            HPI 1.  Obesity-patient is frustrated by her inability to lose weight despite being highly focused on dietary restrictions recently.  She denies any unexplained hair loss or cold intolerance.  2.  Anxiety-patient reports anxiety overall is better.  She is concerned about heart palpitations that occur after she takes her sertraline 50 mg dose.  She is also taking BuSpar once daily, 5 mg.  She denies any confusion, recent tearfulness  ROS       Objective:     /76 (BP Location: Left arm, Patient Position: Sitting, BP Cuff Size: Adult)   Pulse (!) 56   Temp 37.2 °C (99 °F) (Temporal)   Resp 16   Ht 1.575 m (5' 2.01\")   Wt 88 kg (194 lb)   SpO2 97%   BMI 35.47 kg/m²      Physical Exam  Gen.- alert, cooperative, in no acute distress  Neck- midline trachea, thyroid not enlarged or tender,supple, no cervical adenopathy  Chest-clear to auscultation and percussion with normal breath sounds. No retractions. Chest wall nontender  Cardiac- regular rhythm and rate. No murmur, thrill, or heave  Psych-normal affect with good eye contact. Normal grooming. Oriented x4.            Assessment/Plan:        1. Obesity (BMI 30-39.9)    - phentermine 37.5 MG capsule; Take 1 Cap by mouth every morning for 30 days.  Dispense: 30 Cap; Refill: 0    2. Anxiety    Plan: 1.  Patient may go ahead and suspend the every day dose of 50 mg of sertraline.  If anxiety symptoms increase she will contact us for a substitute medication  2.  Continue on BuSpar 5 mg typically she is only taking that once a day  3.  Trial of phentermine 37.5 mg every morning to assist with weight loss  4.  Patient will go to [facility] to have the labs collected that were ordered back in February  5.  Revisit with me in 1 month  "

## 2020-10-07 ENCOUNTER — PATIENT MESSAGE (OUTPATIENT)
Dept: MEDICAL GROUP | Facility: MEDICAL CENTER | Age: 30
End: 2020-10-07

## 2020-11-02 ENCOUNTER — OFFICE VISIT (OUTPATIENT)
Dept: MEDICAL GROUP | Facility: MEDICAL CENTER | Age: 30
End: 2020-11-02
Attending: FAMILY MEDICINE
Payer: MEDICAID

## 2020-11-02 VITALS
BODY MASS INDEX: 35.15 KG/M2 | OXYGEN SATURATION: 97 % | WEIGHT: 191 LBS | DIASTOLIC BLOOD PRESSURE: 66 MMHG | HEIGHT: 62 IN | RESPIRATION RATE: 16 BRPM | TEMPERATURE: 97.7 F | HEART RATE: 64 BPM | SYSTOLIC BLOOD PRESSURE: 110 MMHG

## 2020-11-02 DIAGNOSIS — F41.9 ANXIETY: ICD-10-CM

## 2020-11-02 DIAGNOSIS — E66.01 MORBID OBESITY DUE TO EXCESS CALORIES (HCC): ICD-10-CM

## 2020-11-02 PROCEDURE — 99213 OFFICE O/P EST LOW 20 MIN: CPT | Performed by: FAMILY MEDICINE

## 2020-11-02 RX ORDER — PHENTERMINE HYDROCHLORIDE 15 MG/1
15 CAPSULE ORAL EVERY MORNING
Qty: 30 CAP | Refills: 0 | Status: SHIPPED | OUTPATIENT
Start: 2020-11-02 | End: 2021-11-16 | Stop reason: SDUPTHER

## 2020-11-02 ASSESSMENT — FIBROSIS 4 INDEX: FIB4 SCORE: 0.46

## 2020-11-02 NOTE — PROGRESS NOTES
"Subjective:      Natalya Salguero is a 30 y.o. female who presents with Weight Loss (pt would like her dose of phentermine dropped or tablets ordered so she can cut them in half)            HPI 1.  Desired weight loss-patient ports that she felt a internal sense of significant jitteriness associated with taking the phentermine 37.5 mg dose.  She tried it for 2 to 3 days and then discontinued it and feeling immediately cleared up.  Not reporting any palpitations or chest pain.  2.  Anxiety-patient reports that overall anxiety is being controlled with the BuSpar 5 mg twice daily.    ROS negative for nausea, diarrhea, confusion       Objective:     /66 (BP Location: Left arm, Patient Position: Sitting, BP Cuff Size: Adult long)   Pulse 64   Temp 36.5 °C (97.7 °F) (Temporal)   Resp 16   Ht 1.575 m (5' 2.01\")   Wt 86.6 kg (191 lb)   SpO2 97%   BMI 34.93 kg/m²      Physical Exam  Gen.- alert, cooperative, in no acute distress  Neck- midline trachea, thyroid not enlarged or tender,supple, no cervical adenopathy  Chest-clear to auscultation and percussion with normal breath sounds. No retractions. Chest wall nontender  Cardiac- regular rhythm and rate. No murmur, thrill, or heave            Assessment/Plan:        1. Morbid obesity due to excess calories (HCC)    - phentermine 15 MG capsule; Take 1 Cap by mouth every morning for 30 days.  Dispense: 30 Cap; Refill: 0    2. Anxiety    Plan: 1.  Cautious trial of 15 mg of phentermine once daily  2.  Recheck in 1 month  "

## 2020-11-16 ENCOUNTER — PATIENT MESSAGE (OUTPATIENT)
Dept: MEDICAL GROUP | Facility: MEDICAL CENTER | Age: 30
End: 2020-11-16

## 2020-11-23 ENCOUNTER — PATIENT MESSAGE (OUTPATIENT)
Dept: MEDICAL GROUP | Facility: MEDICAL CENTER | Age: 30
End: 2020-11-23

## 2021-02-09 ENCOUNTER — APPOINTMENT (OUTPATIENT)
Dept: RADIOLOGY | Facility: MEDICAL CENTER | Age: 31
End: 2021-02-09
Attending: EMERGENCY MEDICINE
Payer: MEDICAID

## 2021-02-09 ENCOUNTER — HOSPITAL ENCOUNTER (EMERGENCY)
Facility: MEDICAL CENTER | Age: 31
End: 2021-02-09
Attending: EMERGENCY MEDICINE
Payer: MEDICAID

## 2021-02-09 VITALS
TEMPERATURE: 97.1 F | SYSTOLIC BLOOD PRESSURE: 129 MMHG | OXYGEN SATURATION: 96 % | BODY MASS INDEX: 36.92 KG/M2 | RESPIRATION RATE: 16 BRPM | WEIGHT: 200.62 LBS | HEART RATE: 58 BPM | HEIGHT: 62 IN | DIASTOLIC BLOOD PRESSURE: 77 MMHG

## 2021-02-09 DIAGNOSIS — N12 PYELONEPHRITIS: ICD-10-CM

## 2021-02-09 DIAGNOSIS — R10.9 FLANK PAIN: ICD-10-CM

## 2021-02-09 LAB
ANION GAP SERPL CALC-SCNC: 10 MMOL/L (ref 7–16)
APPEARANCE UR: ABNORMAL
BACTERIA #/AREA URNS HPF: ABNORMAL /HPF
BILIRUB UR QL STRIP.AUTO: NEGATIVE
BUN SERPL-MCNC: 11 MG/DL (ref 8–22)
CALCIUM SERPL-MCNC: 8.7 MG/DL (ref 8.5–10.5)
CHLORIDE SERPL-SCNC: 105 MMOL/L (ref 96–112)
CO2 SERPL-SCNC: 20 MMOL/L (ref 20–33)
COLOR UR: YELLOW
CREAT SERPL-MCNC: 0.51 MG/DL (ref 0.5–1.4)
EPI CELLS #/AREA URNS HPF: ABNORMAL /HPF
ERYTHROCYTE [DISTWIDTH] IN BLOOD BY AUTOMATED COUNT: 44.2 FL (ref 35.9–50)
GLUCOSE SERPL-MCNC: 80 MG/DL (ref 65–99)
GLUCOSE UR STRIP.AUTO-MCNC: NEGATIVE MG/DL
HCG UR QL: NEGATIVE
HCT VFR BLD AUTO: 40 % (ref 37–47)
HGB BLD-MCNC: 12.8 G/DL (ref 12–16)
HYALINE CASTS #/AREA URNS LPF: ABNORMAL /LPF
KETONES UR STRIP.AUTO-MCNC: NEGATIVE MG/DL
LEUKOCYTE ESTERASE UR QL STRIP.AUTO: ABNORMAL
MCH RBC QN AUTO: 29.1 PG (ref 27–33)
MCHC RBC AUTO-ENTMCNC: 32 G/DL (ref 33.6–35)
MCV RBC AUTO: 90.9 FL (ref 81.4–97.8)
MICRO URNS: ABNORMAL
NITRITE UR QL STRIP.AUTO: NEGATIVE
PH UR STRIP.AUTO: 6.5 [PH] (ref 5–8)
PLATELET # BLD AUTO: 258 K/UL (ref 164–446)
PMV BLD AUTO: 10.2 FL (ref 9–12.9)
POTASSIUM SERPL-SCNC: 4.2 MMOL/L (ref 3.6–5.5)
PROT UR QL STRIP: NEGATIVE MG/DL
RBC # BLD AUTO: 4.4 M/UL (ref 4.2–5.4)
RBC # URNS HPF: ABNORMAL /HPF
RBC UR QL AUTO: NEGATIVE
SODIUM SERPL-SCNC: 135 MMOL/L (ref 135–145)
SP GR UR STRIP.AUTO: 1.02
UROBILINOGEN UR STRIP.AUTO-MCNC: 0.2 MG/DL
WBC # BLD AUTO: 8.3 K/UL (ref 4.8–10.8)
WBC #/AREA URNS HPF: ABNORMAL /HPF

## 2021-02-09 PROCEDURE — 85027 COMPLETE CBC AUTOMATED: CPT

## 2021-02-09 PROCEDURE — 96375 TX/PRO/DX INJ NEW DRUG ADDON: CPT

## 2021-02-09 PROCEDURE — 74176 CT ABD & PELVIS W/O CONTRAST: CPT

## 2021-02-09 PROCEDURE — 700105 HCHG RX REV CODE 258: Performed by: EMERGENCY MEDICINE

## 2021-02-09 PROCEDURE — 87086 URINE CULTURE/COLONY COUNT: CPT

## 2021-02-09 PROCEDURE — 99284 EMERGENCY DEPT VISIT MOD MDM: CPT

## 2021-02-09 PROCEDURE — 81025 URINE PREGNANCY TEST: CPT

## 2021-02-09 PROCEDURE — 36415 COLL VENOUS BLD VENIPUNCTURE: CPT

## 2021-02-09 PROCEDURE — 96365 THER/PROPH/DIAG IV INF INIT: CPT

## 2021-02-09 PROCEDURE — 81001 URINALYSIS AUTO W/SCOPE: CPT

## 2021-02-09 PROCEDURE — 700111 HCHG RX REV CODE 636 W/ 250 OVERRIDE (IP): Performed by: EMERGENCY MEDICINE

## 2021-02-09 PROCEDURE — 80048 BASIC METABOLIC PNL TOTAL CA: CPT

## 2021-02-09 RX ORDER — PHENAZOPYRIDINE HYDROCHLORIDE 200 MG/1
200 TABLET, FILM COATED ORAL 3 TIMES DAILY PRN
Qty: 6 TAB | Refills: 0 | Status: SHIPPED | OUTPATIENT
Start: 2021-02-09 | End: 2021-10-20

## 2021-02-09 RX ORDER — KETOROLAC TROMETHAMINE 30 MG/ML
30 INJECTION, SOLUTION INTRAMUSCULAR; INTRAVENOUS ONCE
Status: COMPLETED | OUTPATIENT
Start: 2021-02-09 | End: 2021-02-09

## 2021-02-09 RX ORDER — CEFDINIR 300 MG/1
300 CAPSULE ORAL 2 TIMES DAILY
Qty: 14 CAP | Refills: 0 | Status: SHIPPED | OUTPATIENT
Start: 2021-02-09 | End: 2021-02-16

## 2021-02-09 RX ADMIN — CEFTRIAXONE SODIUM 2 G: 2 INJECTION, POWDER, FOR SOLUTION INTRAMUSCULAR; INTRAVENOUS at 14:33

## 2021-02-09 RX ADMIN — KETOROLAC TROMETHAMINE 30 MG: 30 INJECTION, SOLUTION INTRAMUSCULAR at 14:33

## 2021-02-09 ASSESSMENT — FIBROSIS 4 INDEX: FIB4 SCORE: 0.46

## 2021-02-09 NOTE — ED NOTES
Pt discharged home per provider in stable condition. Paperwork provided to pt via RN and discharge instructions went over with by RN - pt verbalized understanding of teaching with no questions or concerns and is A/Ox4, VSS. Paperwork in hand on discharge.    Prescriptions called into [facility]. Pt understood to  when able. No questions or concerns. Pt has paperwork in hand.

## 2021-02-09 NOTE — ED NOTES
Blood sent to lab at this time   Protocol For Photochemotherapy For Severe Photoresponsive Dermatoses: Tar And Nbuvb (Goeckerman Treatment): The patient received Photochemotherapy for severe photoresponsive dermatoses: Tar and NBUVB (Goeckerman treatment) requiring at least 4 to 8 hours of care under direct physician supervision. Protocol For Photochemotherapy: Baby Oil And Nbuvb: The patient received Photochemotherapy: Baby Oil and NBUVB (baby oil applied to all lesions prior to phototherapy). Protocol For Bath Puva: The patient received Bath PUVA. Protocol For Uva: The patient received UVA. Protocol For Photochemotherapy For Severe Photoresponsive Dermatoses: Petrolatum And Broad Band Uvb: The patient received Photochemotherapyfor severe photoresponsive dermatoses: Petrolatum and Broad Band UVB requiring at least 4 to 8 hours of care under direct physician supervision. Protocol For Uva1: The patient received UVA1. Protocol For Photochemotherapy For Severe Photoresponsive Dermatoses: Tar And Broad Band Uvb (Goeckerman Treatment): The patient received Photochemotherapy for severe photoresponsive dermatoses: Tar and Broad Band UVB (Goeckerman treatment) requiring at least 4 to 8 hours of care under direct physician supervision. Treatment Number: 29 Protocol For Nbuvb: The patient received NBUVB. Protocol For Photochemotherapy: Tar And Broad Band Uvb (Goeckerman Treatment): The patient received Photochemotherapy: Tar and Broad Band UVB (Goeckerman treatment). Protocol For Photochemotherapy: Petrolatum And Broad Band Uvb: The patient received Photochemotherapy: Petrolatum and Broad Band UVB. Protocol For Protocol For Photochemotherapy For Severe Photoresponsive Dermatoses: Bath Puva: The patient received Photochemotherapy for severe photoresponsive dermatoses: Bath PUVA requiring at least 4 to 8 hours of care under direct physician supervision. Post-Care Instructions: I reviewed with the patient in detail post-care instructions. Patient is to wear sun protection. Patients may expect sunburn like redness, discomfort and scabbing. Protocol For Photochemotherapy: Tar And Nbuvb (Goeckerman Treatment): The patient received Photochemotherapy: Tar and NBUVB (Goeckerman treatment). Protocol For Photochemotherapy For Severe Photoresponsive Dermatoses: Petrolatum And Nbuvb: The patient received Photochemotherapy for severe photoresponsive dermatoses: Petrolatum and NBUVB requiring at least 4 to 8 hours of care under direct physician supervision. Total Body Energy: 586 Protocol For Photochemotherapy: Mineral Oil And Nbuvb: The patient received Photochemotherapy: Mineral Oil and NBUVB (mineral oil applied to all lesions prior to phototherapy). Detail Level: Zone Total Body Time: 2:43 Protocol For Photochemotherapy: Triamcinolone Ointment And Nbuvb: The patient received Photochemotherapy: Triamcinolone and NBUVB (triamcinolone ointment applied to all lesions prior to phototherapy). Consent: Written consent obtained.  The risks were reviewed with the patient including but not limited to: burn, pigmentary changes, pain, blistering, scabbing, redness, increased risk of skin cancers, and the remote possibility of scarring. Protocol For Photochemotherapy: Mineral Oil And Broad Band Uvb: The patient received Photochemotherapy: Mineral Oil and Broad Band UVB. Protocol: NBUVB Protocol For Nb Uva: The patient received NB UVA. Protocol For Puva: The patient received PUVA. Protocol For Photochemotherapy: Petrolatum And Nbuvb: The patient received Photochemotherapy: Petrolatum and NBUVB (petrolatum applied to all lesions prior to phototherapy). Render Post-Care In The Note: no Protocol For Broad Band Uvb: The patient received Broad Band UVB. Protocol For Photochemotherapy For Severe Photoresponsive Dermatoses: Puva: The patient received Photochemotherapy for severe photoresponsive dermatoses: PUVA requiring at least 4 to 8 hours of care under direct physician supervision.

## 2021-02-09 NOTE — ED PROVIDER NOTES
ED Provider Note    Scribed for Carroll Samuels M.D. by Destiny Napoles. 2/9/2021  1:23 PM    Primary care provider: Gaetano Keller M.D.  Means of arrival: Walk-In  History obtained from: Patient  History limited by: None    CHIEF COMPLAINT  Flank pain    HPI  Natalya Salguero is a 30 y.o. female who presents to the Emergency Department for evaluation of acute, right sided flank pain that is described as constant. The pain began yesterday morning, and radiates throughout the lower back and into the upper abdomen. The upper abdominal pain is described as intermittent spurts of sharp pain under the ribcage. The patient has a past history of sciatica, and at first related the pain to that. She has a surgical history including appendectomy and cholecystectomy secondary to perforated ulcers. At this time she rates the pain at a 7/10. She denies any emesis, fever, loss of apetite, chills, or dysuria at this time.     REVIEW OF SYSTEMS  Pertinent positives include flank pain, upper abdominal pain.   Pertinent negatives include no emesis, fever, loss of apetite, chills, or dysuria.    All other systems reviewed and negative. See HPI for further details.       PAST MEDICAL HISTORY   has a past medical history of Anemia, Anesthesia, Anxiety, Chronic GERD (2/28/2020), Depression, Gastric ulcer, chronic, Pregnancy, Psychiatric problem, Ulcer (2009), and Ulcer (09/2009).    SURGICAL HISTORY   has a past surgical history that includes other abdominal surgery (2009); repeat c section (9/14/2017); laparoscopy (3/22/2010); primary c section (11/19/2012); and abdominal exploration (09/2009).    SOCIAL HISTORY  Social History     Tobacco Use   • Smoking status: Current Every Day Smoker     Packs/day: 0.12     Years: 6.00     Pack years: 0.72     Types: Cigarettes   • Smokeless tobacco: Never Used   Substance Use Topics   • Alcohol use: Yes     Alcohol/week: 0.0 oz     Comment: Socially   • Drug use: No      Social History  "    Substance and Sexual Activity   Drug Use No       FAMILY HISTORY  Family History   Problem Relation Age of Onset   • Diabetes Mother    • Hyperlipidemia Mother    • Hypertension Mother    • Heart Disease Mother    • Hyperlipidemia Father    • Cancer Maternal Grandmother         lung cancer       CURRENT MEDICATIONS  Home Medications     Reviewed by Stephen Stiles R.N. (Registered Nurse) on 02/09/21 at 1224  Med List Status: Not Addressed   Medication Last Dose Status   albuterol (PROVENTIL) 2.5 mg/0.5 mL Nebu Soln  Active   busPIRone (BUSPAR) 5 MG tablet  Active   hydrOXYzine HCl (ATARAX) 25 MG Tab  Active   omeprazole (PRILOSEC) 20 MG delayed-release capsule  Active   sertraline (ZOLOFT) 100 MG Tab  Active                ALLERGIES  Allergies   Allergen Reactions   • Other Environmental      Adhesive tape causes blisters       PHYSICAL EXAM  VITAL SIGNS: /77   Pulse (!) 58   Temp 36.2 °C (97.1 °F) (Temporal)   Resp 16   Ht 1.575 m (5' 2\")   Wt 91 kg (200 lb 9.9 oz)   SpO2 96%   BMI 36.69 kg/m²     Nursing note and vitals reviewed.  Constitutional: Well-developed and well-nourished. No distress.   HENT: Head is normocephalic and atraumatic. Oropharynx is clear and moist without exudate or erythema.   Eyes: Pupils are equal, round, and reactive to light. Conjunctiva are normal.   Cardiovascular: Normal rate and regular rhythm. No murmur heard. Normal radial pulses.  Pulmonary/Chest: Breath sounds normal. No wheezes or rales.   Abdominal: Soft and non-tender. No distention    Musculoskeletal: Extremities exhibit normal range of motion without edema or tenderness.   Neurological: Awake, alert and oriented to person, place, and time. No focal deficits noted.  Skin: Skin is warm and dry. No rash.   Psychiatric: Normal mood and affect. Appropriate for clinical situation.  Back: Right CVA tenderness    DIAGNOSTIC STUDIES / PROCEDURES    LABS  Results for orders placed or performed during the hospital " encounter of 02/09/21   CBC WITHOUT DIFFERENTIAL   Result Value Ref Range    WBC 8.3 4.8 - 10.8 K/uL    RBC 4.40 4.20 - 5.40 M/uL    Hemoglobin 12.8 12.0 - 16.0 g/dL    Hematocrit 40.0 37.0 - 47.0 %    MCV 90.9 81.4 - 97.8 fL    MCH 29.1 27.0 - 33.0 pg    MCHC 32.0 (L) 33.6 - 35.0 g/dL    RDW 44.2 35.9 - 50.0 fL    Platelet Count 258 164 - 446 K/uL    MPV 10.2 9.0 - 12.9 fL   BASIC METABOLIC PANEL   Result Value Ref Range    Sodium 135 135 - 145 mmol/L    Potassium 4.2 3.6 - 5.5 mmol/L    Chloride 105 96 - 112 mmol/L    Co2 20 20 - 33 mmol/L    Glucose 80 65 - 99 mg/dL    Bun 11 8 - 22 mg/dL    Creatinine 0.51 0.50 - 1.40 mg/dL    Calcium 8.7 8.5 - 10.5 mg/dL    Anion Gap 10.0 7.0 - 16.0   URINALYSIS CULTURE, IF INDICATED    Specimen: Urine   Result Value Ref Range    Color Yellow     Character Cloudy (A)     Specific Gravity 1.018 <1.035    Ph 6.5 5.0 - 8.0    Glucose Negative Negative mg/dL    Ketones Negative Negative mg/dL    Protein Negative Negative mg/dL    Bilirubin Negative Negative    Urobilinogen, Urine 0.2 Negative    Nitrite Negative Negative    Leukocyte Esterase Moderate (A) Negative    Occult Blood Negative Negative    Micro Urine Req Microscopic    HCG QUALITATIVE UR (Lab)   Result Value Ref Range    Beta-Hcg Urine Negative Negative   URINE MICROSCOPIC (W/UA)   Result Value Ref Range    WBC 20-50 (A) /hpf    RBC 0-2 /hpf    Bacteria Moderate (A) None /hpf    Epithelial Cells Moderate (A) /hpf    Hyaline Cast 3-5 (A) /lpf   URINE CULTURE(NEW)    Specimen: Urine   Result Value Ref Range    Significant Indicator NEG     Source UR     Site -     Culture Result -    ESTIMATED GFR   Result Value Ref Range    GFR If African American >60 >60 mL/min/1.73 m 2    GFR If Non African American >60 >60 mL/min/1.73 m 2   All labs reviewed by me.    RADIOLOGY  CT-RENAL COLIC EVALUATION(A/P W/O)   Final Result      1.  Negative for renal or ureteral calculi      2.  Normal appendix      3.  2.7 cm left adnexal cyst  that is likely physiologic follicle      4.  Facet arthropathy of the lower lumbar spine        The radiologist's interpretation of all radiological studies have been reviewed by me.    COURSE & MEDICAL DECISION MAKING  Nursing notes, VS, PMSFHx reviewed in chart.     Review of past medical records shows the patient has no pertinent past medical history related to the chief complaint.      1:23 PM - Patient seen and examined at bedside. I verified that the patient was wearing a mask and I was wearing appropriate PPE every time I entered the room. The patient's mask was on the patient at all times during my encounter except for a brief view of the oropharynx. Patient will be treated with Toradol. Ordered CT-renal, CBC without differential, basic metabolic panel, UA culture, HCQ qual to evaluate her symptoms. The differential diagnoses include but are not limited to: Kidney stones, pyelonephritis     2:21 PM- Review of laboratory results reveal UA results are consistent with a UTI and mild pyelonephritis. She is a good candidate for outpatient treatment.  Tolerating orals.  At this time I updated the patient on labs and imaging, and informed her of the plan of discharge moving forward. She understands and gives verbal agreement. I allowed her to ask any questions or voice concerns at this time.     The patient will return for new or worsening symptoms and is stable at the time of discharge.    The patient is referred to a primary physician for blood pressure management, diabetic screening, and for all other preventative health concerns.    DISPOSITION:  Patient will be discharged home in stable condition.    FOLLOW UP:  Gaetano Keller M.D.  [address]  [facility] [id number] [phone]    Schedule an appointment as soon as possible for a visit       [facility], Emergency Dept  [address] [facility] [id number] [phone]    If symptoms worsen      OUTPATIENT MEDICATIONS:  New  Prescriptions    CEFDINIR (OMNICEF) 300 MG CAP    Take 1 Cap by mouth 2 times a day for 7 days.    PHENAZOPYRIDINE (PYRIDIUM) 200 MG TAB    Take 1 Tab by mouth 3 times a day as needed.         FINAL IMPRESSION  1. Pyelonephritis    2. Flank pain          Destiny KATZ (Saqib), am scribing for, and in the presence of, Carroll Samuels M.D..    Electronically signed by: Destiny Napoles (Saqib), 2/9/2021    ICarroll M.D. personally performed the services described in this documentation, as scribed by Destiny Napoles in my presence, and it is both accurate and complete.    The note accurately reflects work and decisions made by me.  Carroll Samuels M.D.  2/9/2021  2:39 PM

## 2021-02-11 LAB
BACTERIA UR CULT: NORMAL
SIGNIFICANT IND 70042: NORMAL
SITE SITE: NORMAL
SOURCE SOURCE: NORMAL

## 2021-02-22 ENCOUNTER — HOSPITAL ENCOUNTER (OUTPATIENT)
Facility: MEDICAL CENTER | Age: 31
End: 2021-02-22
Attending: NURSE PRACTITIONER
Payer: MEDICAID

## 2021-02-22 PROCEDURE — U0005 INFEC AGEN DETEC AMPLI PROBE: HCPCS

## 2021-02-22 PROCEDURE — U0003 INFECTIOUS AGENT DETECTION BY NUCLEIC ACID (DNA OR RNA); SEVERE ACUTE RESPIRATORY SYNDROME CORONAVIRUS 2 (SARS-COV-2) (CORONAVIRUS DISEASE [COVID-19]), AMPLIFIED PROBE TECHNIQUE, MAKING USE OF HIGH THROUGHPUT TECHNOLOGIES AS DESCRIBED BY CMS-2020-01-R: HCPCS

## 2021-02-23 LAB
COVID ORDER STATUS COVID19: NORMAL
SARS-COV-2 RNA RESP QL NAA+PROBE: NOTDETECTED
SPECIMEN SOURCE: NORMAL

## 2021-03-27 ENCOUNTER — APPOINTMENT (OUTPATIENT)
Dept: RADIOLOGY | Facility: MEDICAL CENTER | Age: 31
End: 2021-03-27
Attending: EMERGENCY MEDICINE
Payer: MEDICAID

## 2021-03-27 ENCOUNTER — HOSPITAL ENCOUNTER (EMERGENCY)
Facility: MEDICAL CENTER | Age: 31
End: 2021-03-27
Attending: EMERGENCY MEDICINE | Admitting: EMERGENCY MEDICINE
Payer: MEDICAID

## 2021-03-27 VITALS
RESPIRATION RATE: 16 BRPM | SYSTOLIC BLOOD PRESSURE: 109 MMHG | HEIGHT: 62 IN | TEMPERATURE: 97.2 F | DIASTOLIC BLOOD PRESSURE: 68 MMHG | OXYGEN SATURATION: 96 % | HEART RATE: 71 BPM | WEIGHT: 203.93 LBS | BODY MASS INDEX: 37.53 KG/M2

## 2021-03-27 DIAGNOSIS — R07.9 CHEST PAIN, UNSPECIFIED TYPE: ICD-10-CM

## 2021-03-27 LAB
ANION GAP SERPL CALC-SCNC: 12 MMOL/L (ref 7–16)
BASOPHILS # BLD AUTO: 1 % (ref 0–1.8)
BASOPHILS # BLD: 0.09 K/UL (ref 0–0.12)
BUN SERPL-MCNC: 10 MG/DL (ref 8–22)
CALCIUM SERPL-MCNC: 8.9 MG/DL (ref 8.5–10.5)
CHLORIDE SERPL-SCNC: 108 MMOL/L (ref 96–112)
CO2 SERPL-SCNC: 20 MMOL/L (ref 20–33)
CREAT SERPL-MCNC: 0.61 MG/DL (ref 0.5–1.4)
D DIMER PPP IA.FEU-MCNC: 0.27 UG/ML (FEU) (ref 0–0.5)
EKG IMPRESSION: NORMAL
EOSINOPHIL # BLD AUTO: 0.11 K/UL (ref 0–0.51)
EOSINOPHIL NFR BLD: 1.2 % (ref 0–6.9)
ERYTHROCYTE [DISTWIDTH] IN BLOOD BY AUTOMATED COUNT: 44.3 FL (ref 35.9–50)
GLUCOSE SERPL-MCNC: 104 MG/DL (ref 65–99)
HCG SERPL QL: NEGATIVE
HCT VFR BLD AUTO: 40.1 % (ref 37–47)
HGB BLD-MCNC: 13.4 G/DL (ref 12–16)
IMM GRANULOCYTES # BLD AUTO: 0.03 K/UL (ref 0–0.11)
IMM GRANULOCYTES NFR BLD AUTO: 0.3 % (ref 0–0.9)
LYMPHOCYTES # BLD AUTO: 2.61 K/UL (ref 1–4.8)
LYMPHOCYTES NFR BLD: 28.2 % (ref 22–41)
MCH RBC QN AUTO: 29.9 PG (ref 27–33)
MCHC RBC AUTO-ENTMCNC: 33.4 G/DL (ref 33.6–35)
MCV RBC AUTO: 89.5 FL (ref 81.4–97.8)
MONOCYTES # BLD AUTO: 0.77 K/UL (ref 0–0.85)
MONOCYTES NFR BLD AUTO: 8.3 % (ref 0–13.4)
NEUTROPHILS # BLD AUTO: 5.66 K/UL (ref 2–7.15)
NEUTROPHILS NFR BLD: 61 % (ref 44–72)
NRBC # BLD AUTO: 0 K/UL
NRBC BLD-RTO: 0 /100 WBC
PLATELET # BLD AUTO: 295 K/UL (ref 164–446)
PMV BLD AUTO: 9.6 FL (ref 9–12.9)
POTASSIUM SERPL-SCNC: 4.1 MMOL/L (ref 3.6–5.5)
RBC # BLD AUTO: 4.48 M/UL (ref 4.2–5.4)
SODIUM SERPL-SCNC: 140 MMOL/L (ref 135–145)
TROPONIN T SERPL-MCNC: <6 NG/L (ref 6–19)
WBC # BLD AUTO: 9.3 K/UL (ref 4.8–10.8)

## 2021-03-27 PROCEDURE — 85025 COMPLETE CBC W/AUTO DIFF WBC: CPT

## 2021-03-27 PROCEDURE — 93005 ELECTROCARDIOGRAM TRACING: CPT

## 2021-03-27 PROCEDURE — 99283 EMERGENCY DEPT VISIT LOW MDM: CPT

## 2021-03-27 PROCEDURE — 36415 COLL VENOUS BLD VENIPUNCTURE: CPT

## 2021-03-27 PROCEDURE — 80048 BASIC METABOLIC PNL TOTAL CA: CPT

## 2021-03-27 PROCEDURE — 84703 CHORIONIC GONADOTROPIN ASSAY: CPT

## 2021-03-27 PROCEDURE — 71045 X-RAY EXAM CHEST 1 VIEW: CPT

## 2021-03-27 PROCEDURE — 93005 ELECTROCARDIOGRAM TRACING: CPT | Performed by: EMERGENCY MEDICINE

## 2021-03-27 PROCEDURE — 85379 FIBRIN DEGRADATION QUANT: CPT

## 2021-03-27 PROCEDURE — 84484 ASSAY OF TROPONIN QUANT: CPT

## 2021-03-27 RX ORDER — LIDOCAINE 50 MG/G
1 PATCH TOPICAL EVERY 24 HOURS
Qty: 15 PATCH | Refills: 0 | Status: SHIPPED | OUTPATIENT
Start: 2021-03-27 | End: 2021-10-20

## 2021-03-27 ASSESSMENT — FIBROSIS 4 INDEX: FIB4 SCORE: 0.42

## 2021-03-27 NOTE — ED PROVIDER NOTES
"ED Provider Note    CHIEF COMPLAINT  Chief Complaint   Patient presents with   • Chest Pain     Pt presents with a complaing of \"twisting chest pain\" that has been present for appx 4 day. Pt states she was at work when the pain started. Pt states nothing makes the pain better but moving her left arm and deep breaths makes the pain worse. Pt denies OTC medication for symptom control.        HPI  Natalya Salguero is a 30 y.o. female who presents to the emergency department with a chief complaint of chest pain.  She states has been present for.  She works as a medical assistant and first noticed that at work, it is left-sided and felt like a pressure in her chest which was constant and now it is become worse with movement especially of her left arm that radiates into her left axilla deep breaths makes the pain worse.  She states she has no history of taking birth control pills she does smoke cigarettes.  She has had no coagulopathy.  She denies diabetes or hypertension she states she does have a family history of cardiac disease heart attack and 56.    REVIEW OF SYSTEMS  Positive for chest pain, Negative for shortness of breath exertional pain in the suspect diaphoresis and all other systems are negative   PAST MEDICAL HISTORY   has a past medical history of Anemia, Anesthesia, Anxiety, Chronic GERD (2/28/2020), Depression, Gastric ulcer, chronic, Pregnancy, Psychiatric problem, Ulcer (2009), and Ulcer (09/2009).    SOCIAL HISTORY  Social History     Tobacco Use   • Smoking status: Current Every Day Smoker     Packs/day: 0.12     Years: 6.00     Pack years: 0.72     Types: Cigarettes   • Smokeless tobacco: Never Used   Substance and Sexual Activity   • Alcohol use: Yes     Alcohol/week: 0.0 oz     Comment: Socially   • Drug use: No   • Sexual activity: Not Currently     Partners: Male     Birth control/protection: Condom       SURGICAL HISTORY   has a past surgical history that includes other abdominal surgery " "(); repeat c section (2017); laparoscopy (3/22/2010); primary c section (2012); and abdominal exploration (2009).    CURRENT MEDICATIONS  Reviewed.  See Encounter Summary.      ALLERGIES  Allergies   Allergen Reactions   • Other Environmental      Adhesive tape causes blisters       PHYSICAL EXAM  VITAL SIGNS: /75   Pulse 78   Temp 36.2 °C (97.1 °F) (Temporal)   Resp 20   Ht 1.575 m (5' 2\")   Wt 92.5 kg (203 lb 14.8 oz)   LMP 2021   SpO2 95%   BMI 37.30 kg/m²   Constitutional:  Alert in no apparent distress.  HENT: Normocephalic, Bilateral external ears normal. Nose normal.   Eyes: Pupils are equal and reactive. Conjunctiva normal, non-icteric.   CV:  RRR no MGR  Thorax & Lungs: Easy unlabored respirations chest wall tenderness left-sided it is along her left breast and goes into left axilla no axillary nodes are noted  Abdomen:  No gross signs of peritonitis, no pain with movement   Skin: Visualized skin is  Dry, No erythema, No rash.   Extremities:   No edema, No asymmetry  Neurologic: Alert, Grossly non-focal.   Psychiatric: Affect and Mood normal      COURSE & MEDICAL DECISION MAKING  Nursing notes and vital signs were reviewed. (See chart for details)    The patient presents to the Emergency Department with *chest pain.  She is concerned about a cardiac source as her mother  of a heart attack in her 50s.  I will do a troponin and EKG.  With regard to her EKG which was performed on arrival there was some indication of RSR prime and I will do a D-dimer to make sure even though she is low risk that it is negative to exclude PE she is not hypoxic tachycardic and other than smoking has no risk factors.  Chest x-ray will be performed to rule out lung abnormality or pneumothorax    DX-CHEST-PORTABLE (1 VIEW)   Final Result      No acute cardiopulmonary abnormality.        Results for orders placed or performed during the hospital encounter of 21   CBC WITH DIFFERENTIAL "   Result Value Ref Range    WBC 9.3 4.8 - 10.8 K/uL    RBC 4.48 4.20 - 5.40 M/uL    Hemoglobin 13.4 12.0 - 16.0 g/dL    Hematocrit 40.1 37.0 - 47.0 %    MCV 89.5 81.4 - 97.8 fL    MCH 29.9 27.0 - 33.0 pg    MCHC 33.4 (L) 33.6 - 35.0 g/dL    RDW 44.3 35.9 - 50.0 fL    Platelet Count 295 164 - 446 K/uL    MPV 9.6 9.0 - 12.9 fL    Neutrophils-Polys 61.00 44.00 - 72.00 %    Lymphocytes 28.20 22.00 - 41.00 %    Monocytes 8.30 0.00 - 13.40 %    Eosinophils 1.20 0.00 - 6.90 %    Basophils 1.00 0.00 - 1.80 %    Immature Granulocytes 0.30 0.00 - 0.90 %    Nucleated RBC 0.00 /100 WBC    Neutrophils (Absolute) 5.66 2.00 - 7.15 K/uL    Lymphs (Absolute) 2.61 1.00 - 4.80 K/uL    Monos (Absolute) 0.77 0.00 - 0.85 K/uL    Eos (Absolute) 0.11 0.00 - 0.51 K/uL    Baso (Absolute) 0.09 0.00 - 0.12 K/uL    Immature Granulocytes (abs) 0.03 0.00 - 0.11 K/uL    NRBC (Absolute) 0.00 K/uL   BMP   Result Value Ref Range    Sodium 140 135 - 145 mmol/L    Potassium 4.1 3.6 - 5.5 mmol/L    Chloride 108 96 - 112 mmol/L    Co2 20 20 - 33 mmol/L    Glucose 104 (H) 65 - 99 mg/dL    Bun 10 8 - 22 mg/dL    Creatinine 0.61 0.50 - 1.40 mg/dL    Calcium 8.9 8.5 - 10.5 mg/dL    Anion Gap 12.0 7.0 - 16.0   D-DIMER   Result Value Ref Range    D-Dimer Screen 0.27 0.00 - 0.50 ug/mL (FEU)   BETA-HCG QUALITATIVE SERUM   Result Value Ref Range    Beta-Hcg Qualitative Serum Negative Negative   TROPONIN   Result Value Ref Range    Troponin T <6 6 - 19 ng/L   ESTIMATED GFR   Result Value Ref Range    GFR If African American >60 >60 mL/min/1.73 m 2    GFR If Non African American >60 >60 mL/min/1.73 m 2   EKG (NOW)   Result Value Ref Range    Report       [facility] Emergency Dept.    Test Date:  2021  Pt Name:    CARO GUZMAN                Department: ER  MRN:        [id number]                      Room:  Gender:     Female                       Technician: [id number]  :        1990                   Requested By:ER TRIAGE  PROTOCOL  Order #:    [id number]                    Reading MD:    Measurements  Intervals                                Axis  Rate:       71                           P:          33  HI:         140                          QRS:        47  QRSD:       86                           T:          40  QT:         404  QTc:        439    Interpretive Statements  SINUS RHYTHM  RSR' IN V1 OR V2, PROBABLY NORMAL VARIANT  No previous ECG available for comparison         Patient's work-up is negative for troponin with 3 days of pain and I do not think this is cardiac, she does have RSR prime I have recommended outpatient echo, her D-dimer is negative and a low risk patient setting thus excluding pulmonary emboli.  She is painful to touch and I also would recommend as it is her breast that is most tender in outpatient ultrasound or work-up for possible breast source of this pain this can be done as an outpatient.      DISPOSITION:  Patient will be discharged home in stable condition.    FOLLOW UP:  Gaetano Keller M.D.  [address] [place] [id number]  [phone]            OUTPATIENT MEDICATIONS:  Discharge Medication List as of 3/27/2021 12:19 PM          Patient is comfortable this plan she is being discharged in stable condition I have given her a Lidoderm patch for her discomfort to see if that helps she is unable to take NSAIDs secondary to prior ulcerative disease      FINAL IMPRESSION  1. Chest pain  2. Abnormal EKG  3. Breast Pain    Electronically signed by: Jacquie Frazier M.D., 3/27/2021 12:18 PM

## 2021-03-27 NOTE — ED TRIAGE NOTES
"Natalya Salguero  Chief Complaint   Patient presents with   • Chest Pain     Pt presents with a complaing of \"twisting chest pain\" that has been present for appx 4 day. Pt states she was at work when the pain started. Pt states nothing makes the pain better but moving her left arm and deep breaths makes the pain worse. Pt denies OTC medication for symptom control.      Pt ambulatory to triage with above complaint.     /75   Pulse 78   Temp 36.2 °C (97.1 °F) (Temporal)   Resp 20   Ht 1.575 m (5' 2\")   Wt 92.5 kg (203 lb 14.8 oz)   SpO2 95%   BMI 37.30 kg/m²     Pt informed of triage process and encouraged to notify staff of any changes or concerns. Pt verbalized understanding of instructions. Apologized for long wait time. Pt placed back in lobby.     "

## 2021-03-27 NOTE — ED NOTES
Pt given discharge instructions/prescription given/ home care instructions explained, pt verbalized understanding of instructions given/pt understands the importance of follow up, pt ambulatory to ER christopher.

## 2021-03-29 ENCOUNTER — PATIENT MESSAGE (OUTPATIENT)
Dept: MEDICAL GROUP | Facility: MEDICAL CENTER | Age: 31
End: 2021-03-29

## 2021-03-29 DIAGNOSIS — R07.9 LEFT-SIDED CHEST PAIN: ICD-10-CM

## 2021-03-30 ENCOUNTER — PATIENT MESSAGE (OUTPATIENT)
Dept: MEDICAL GROUP | Facility: MEDICAL CENTER | Age: 31
End: 2021-03-30

## 2021-03-30 NOTE — TELEPHONE ENCOUNTER
"From: Natalya Salguero  To: Physician Gaetano Keller  Sent: 3/30/2021 11:11 AM PDT  Subject: Non-Urgent Medical Question    Hi, How does my blood work look? I meant to ask you yesterday. does that blood work possibly show anything about my Thyroid.   Thank you      ----- Message -----   From:Physician Gaetano Keller   Sent:3/29/2021 12:54 PM PDT   To:Natalya Salguero   Subject:RE: Non-Urgent Medical Question    Natalya, the EKG \"abnormality\" was relatively mild and the lack of evidence for a lung blood clot makes it even less worrisome. We will go ahead and order the echocardiogram. The ER doc also felt that your tenderness was mainly in the tail of your breast so I will order a breast ultrasound. Worsening li worry about with regard to breast problems is breast cancer which usually is not painful but painful things also tend to get everyone's attention so we will see if the breast ultrasound shows any significant findings. My suggestion would be to see you in the office after those 2 studies are obtained. They should be calling you to schedule. Dr. Morelos      ----- Message -----   From:Natalya Salguero   Sent:3/29/2021 9:09 AM PDT   To:Physician Gaetano Keller   Subject:Non-Urgent Medical Question    I was seen at the E.R for Chest pain on Saturday and they wanted me to follow up with you to get referred to a heart specialist to do a ultra sound of the heart. I had an abnormal finding in my EKG. Please let me know what the next step is to get the referral. Thank you  "

## 2021-05-07 ENCOUNTER — APPOINTMENT (OUTPATIENT)
Dept: RADIOLOGY | Facility: MEDICAL CENTER | Age: 31
End: 2021-05-07
Attending: FAMILY MEDICINE
Payer: MEDICAID

## 2021-07-13 ENCOUNTER — PATIENT MESSAGE (OUTPATIENT)
Dept: MEDICAL GROUP | Facility: MEDICAL CENTER | Age: 31
End: 2021-07-13

## 2021-07-13 DIAGNOSIS — J30.2 SEASONAL ALLERGIES: ICD-10-CM

## 2021-07-13 RX ORDER — LORATADINE 10 MG/1
10 TABLET ORAL DAILY
Qty: 30 TABLET | Refills: 6 | Status: SHIPPED | OUTPATIENT
Start: 2021-07-13 | End: 2023-03-25

## 2021-07-13 NOTE — TELEPHONE ENCOUNTER
From: Natalya Salguero  To: Physician Gaetano Keller  Sent: 7/13/2021 11:52 AM PDT  Subject: Non-Urgent Medical Question    Also, could we please re order my blood work that needs to be done. It has been over a year. Thank you      ----- Message -----   From:Physician Gaetano Keller   Sent:7/13/2021 11:36 AM PDT   To:Natalya Salguero   Subject:RE: Non-Urgent Medical Question    We can try placing you on loratadine a allegedly nondrowsy antihistamine. If that is not effective then yes it would like to see you face-to-face. We will send the prescription to your [facility]'s pharmacy      ----- Message -----   From:Natalya Salguero   Sent:7/13/2021 10:52 AM PDT   To:Physician Gaetano Keller   Subject:Non-Urgent Medical Question    I have had the worst allergies which is causing a cough threw out the day and at night it is worse. very short of breath at times. OTC drugs are just not working. Do you have any other rx options or would I need to schd. a visit. Thank you

## 2021-07-15 ENCOUNTER — PATIENT MESSAGE (OUTPATIENT)
Dept: MEDICAL GROUP | Facility: MEDICAL CENTER | Age: 31
End: 2021-07-15

## 2021-07-15 DIAGNOSIS — E66.9 OBESITY (BMI 30-39.9): ICD-10-CM

## 2021-07-15 NOTE — TELEPHONE ENCOUNTER
From: Natalya Salguero  To: Physician Gaetano Keller  Sent: 7/15/2021 8:58 AM PDT  Subject: Non-Urgent Medical Question    yes about my thyroid      ----- Message -----   From:Physician Gaetano Keller   Sent:7/13/2021 12:02 PM PDT   To:Natalya Salguero   Subject:RE: Non-Urgent Medical Question    Natalya, is there a specific test or tests that you are thinking of in regard to your blood work? Annual blood work without a specific target, particularly in your age group has not been shown to foster to better health. You had a normal CBC and an emergency room visit several months ago. Dr. Morelos      ----- Message -----   From:Natalya Salguero   Sent:7/13/2021 11:52 AM PDT   To:Physician Gaetano Keller   Subject:Non-Urgent Medical Question    Also, could we please re order my blood work that needs to be done. It has been over a year. Thank you      ----- Message -----   From:Physician Gaetano Keller   Sent:7/13/2021 11:36 AM PDT   To:Natalya Salguero   Subject:RE: Non-Urgent Medical Question    We can try placing you on loratadine a allegedly nondrowsy antihistamine. If that is not effective then yes it would like to see you face-to-face. We will send the prescription to your [facility]'s pharmacy      ----- Message -----   From:Natalya Salguero   Sent:7/13/2021 10:52 AM PDT   To:Physician Gaetano Keller   Subject:Non-Urgent Medical Question    I have had the worst allergies which is causing a cough threw out the day and at night it is worse. very short of breath at times. OTC drugs are just not working. Do you have any other rx options or would I need to schd. a visit. Thank you

## 2021-07-16 ENCOUNTER — HOSPITAL ENCOUNTER (OUTPATIENT)
Facility: MEDICAL CENTER | Age: 31
End: 2021-07-16
Attending: SPECIALIST
Payer: COMMERCIAL

## 2021-07-16 PROCEDURE — 88175 CYTOPATH C/V AUTO FLUID REDO: CPT

## 2021-07-16 PROCEDURE — 87491 CHLMYD TRACH DNA AMP PROBE: CPT

## 2021-07-16 PROCEDURE — 87591 N.GONORRHOEAE DNA AMP PROB: CPT

## 2021-07-16 PROCEDURE — 87624 HPV HI-RISK TYP POOLED RSLT: CPT

## 2021-07-19 LAB
C TRACH DNA GENITAL QL NAA+PROBE: NEGATIVE
N GONORRHOEA DNA GENITAL QL NAA+PROBE: NEGATIVE
SPECIMEN SOURCE: NORMAL

## 2021-07-20 ENCOUNTER — HOSPITAL ENCOUNTER (OUTPATIENT)
Dept: LAB | Facility: MEDICAL CENTER | Age: 31
End: 2021-07-20
Attending: FAMILY MEDICINE
Payer: MEDICAID

## 2021-07-20 DIAGNOSIS — E66.9 OBESITY (BMI 30-39.9): ICD-10-CM

## 2021-07-20 LAB
CYTOLOGY REG CYTOL: NORMAL
HPV HR 12 DNA CVX QL NAA+PROBE: NEGATIVE
HPV16 DNA SPEC QL NAA+PROBE: NEGATIVE
HPV18 DNA SPEC QL NAA+PROBE: NEGATIVE
SPECIMEN SOURCE: NORMAL
T4 FREE SERPL-MCNC: 0.95 NG/DL (ref 0.93–1.7)
TSH SERPL DL<=0.005 MIU/L-ACNC: 1.8 UIU/ML (ref 0.38–5.33)

## 2021-07-20 PROCEDURE — 36415 COLL VENOUS BLD VENIPUNCTURE: CPT

## 2021-07-20 PROCEDURE — 84443 ASSAY THYROID STIM HORMONE: CPT

## 2021-07-20 PROCEDURE — 84439 ASSAY OF FREE THYROXINE: CPT

## 2021-08-24 ENCOUNTER — PATIENT MESSAGE (OUTPATIENT)
Dept: MEDICAL GROUP | Facility: MEDICAL CENTER | Age: 31
End: 2021-08-24

## 2021-08-24 DIAGNOSIS — B00.89 HERPES DERMATITIS: ICD-10-CM

## 2021-08-26 RX ORDER — ACYCLOVIR 400 MG/1
400 TABLET ORAL 2 TIMES DAILY
Qty: 20 TABLET | Refills: 2 | Status: SHIPPED
Start: 2021-08-26 | End: 2021-10-20

## 2021-08-27 ENCOUNTER — HOSPITAL ENCOUNTER (OUTPATIENT)
Dept: RADIOLOGY | Facility: MEDICAL CENTER | Age: 31
End: 2021-08-27
Attending: FAMILY MEDICINE
Payer: MEDICAID

## 2021-08-27 DIAGNOSIS — R07.9 LEFT-SIDED CHEST PAIN: ICD-10-CM

## 2021-08-27 PROCEDURE — G0279 TOMOSYNTHESIS, MAMMO: HCPCS

## 2021-10-20 ENCOUNTER — HOSPITAL ENCOUNTER (EMERGENCY)
Facility: MEDICAL CENTER | Age: 31
End: 2021-10-20
Attending: EMERGENCY MEDICINE
Payer: MEDICAID

## 2021-10-20 VITALS
SYSTOLIC BLOOD PRESSURE: 127 MMHG | OXYGEN SATURATION: 94 % | DIASTOLIC BLOOD PRESSURE: 75 MMHG | HEART RATE: 68 BPM | HEIGHT: 62 IN | TEMPERATURE: 97.5 F | RESPIRATION RATE: 18 BRPM | BODY MASS INDEX: 38.91 KG/M2 | WEIGHT: 211.42 LBS

## 2021-10-20 DIAGNOSIS — Z20.822 COVID-19 RULED OUT: ICD-10-CM

## 2021-10-20 DIAGNOSIS — R05.9 COUGH: ICD-10-CM

## 2021-10-20 PROCEDURE — 99283 EMERGENCY DEPT VISIT LOW MDM: CPT

## 2021-10-20 ASSESSMENT — ENCOUNTER SYMPTOMS
VOMITING: 0
SORE THROAT: 0
HEMOPTYSIS: 0
FEVER: 0
COUGH: 1

## 2021-10-20 ASSESSMENT — FIBROSIS 4 INDEX: FIB4 SCORE: 0.37

## 2021-10-20 NOTE — ED NOTES
Pt refusing Covid swab, states that getting a 24 hr Covid test will not allow her to go back to work fast enough and is going to try and get a rapid Covid and see her PCP instead. Pt AMA'd w/o signing paperwork.

## 2021-10-20 NOTE — ED NOTES
Pt ambulated with a normal, steady gait to the room from the lobby. Pt asked to change into a gown.

## 2021-10-20 NOTE — DISCHARGE INSTRUCTIONS
Results for your Covid test will be available on [facility] in the next 24 hours.  Please self quarantine until results are known and then follow CDC guidelines if your test is positive.

## 2021-10-20 NOTE — ED NOTES
Chief Complaint   Patient presents with   • Cough     x2days   • Shortness of Breath     Pt ambulated to triage with above complaints started yesterday. Nad. Speaking full sentences.   Pt unvaccinated for covid-19.

## 2021-10-20 NOTE — ED PROVIDER NOTES
ED Provider Note    ED Provider Note    Primary care provider: Gaetano Keller M.D.  Means of arrival: POV  History obtained from: patient  History limited by: None    CHIEF COMPLAINT  Chief Complaint   Patient presents with   • Cough     x2days   • Shortness of Breath       HPI  Natalya Salguero is a 30 y.o. female who presents to the Emergency Department with a chief complaint of a dry cough.  She feels like she cannot take a deep breath.  This started last night.  She denies a sore throat.  No fever.  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  She is unvaccinated against Covid.    REVIEW OF SYSTEMS  Review of Systems   Constitutional: Negative for fever.   HENT: Negative for sore throat.    Respiratory: Positive for cough. Negative for hemoptysis.    Gastrointestinal: Negative for vomiting.       PAST MEDICAL HISTORY   has a past medical history of Anemia, Anesthesia, Anxiety, Chronic GERD (2/28/2020), Depression, Gastric ulcer, chronic, Pregnancy, Psychiatric problem, Ulcer (2009), and Ulcer (09/2009).    SURGICAL HISTORY   has a past surgical history that includes other abdominal surgery (2009); repeat c section (9/14/2017); laparoscopy (3/22/2010); primary c section (11/19/2012); and abdominal exploration (09/2009).    SOCIAL HISTORY  Social History     Tobacco Use   • Smoking status: Current Every Day Smoker     Packs/day: 0.12     Years: 6.00     Pack years: 0.72     Types: Cigarettes   • Smokeless tobacco: Never Used   Substance Use Topics   • Alcohol use: Yes     Alcohol/week: 0.0 oz     Comment: Socially   • Drug use: No      Social History     Substance and Sexual Activity   Drug Use No       FAMILY HISTORY  Family History   Problem Relation Age of Onset   • Diabetes Mother    • Hyperlipidemia Mother    • Hypertension Mother    • Heart Disease Mother    • Hyperlipidemia Father    • Cancer Maternal Grandmother         lung cancer  "      CURRENT MEDICATIONS  Home Medications     Reviewed by Annalise Leiva R.N. (Registered Nurse) on 10/20/21 at [facility]  Med List Status: Complete   Medication Last Dose Status   busPIRone (BUSPAR) 5 MG tablet 10/20/2021 Active   loratadine (CLARITIN) 10 MG Tab  Active   omeprazole (PRILOSEC) 20 MG delayed-release capsule 10/20/2021 Active   sertraline (ZOLOFT) 100 MG Tab 10/20/2021 Active                ALLERGIES  Allergies   Allergen Reactions   • Other Environmental      Adhesive tape causes blisters       PHYSICAL EXAM  VITAL SIGNS: /73   Pulse 71   Temp 36.4 °C (97.5 °F) (Temporal)   Resp (!) 22   Ht 1.575 m (5' 2\")   Wt 95.9 kg (211 lb 6.7 oz)   SpO2 93%   BMI 38.67 kg/m²   Vitals reviewed.  Constitutional: Patient is oriented to person, place, and time. Appears well-developed and well-nourished. No distress.    Head: Normocephalic and atraumatic.  Mouth/Throat: Oropharynx is clear and moist, no exudates.   Eyes: Conjunctivae are normal. Pupils are equal and round.  Neck: Normal range of motion. Neck supple.  Cardiovascular: Normal rate, regular rhythm and normal heart sounds.  Pulmonary/Chest: Effort normal and breath sounds normal. No respiratory distress, no wheezes, rhonchi, or rales.  Musculoskeletal: No edema.  Lymphadenopathy: No cervical adenopathy.   Neurological: No focal deficits.   Skin: Skin is warm and dry. No erythema. No pallor.   Psychiatric: Patient has a normal mood and affect.       COURSE & MEDICAL DECISION MAKING  Pertinent Labs & Imaging studies reviewed. (See chart for details)    Obtained and reviewed past medical records.  Patient's last ED encounter was in March of this year she was seen for chest pain.  Patient had a telephone encounter with her primary care physician in July of this year regarding her thyroid.    11:46 AM - Patient seen and examined at bedside.  This is a pleasant and well-appearing 30-year-old female.  She presents with cough and needing screening " for Covid in order to return back to work.  She has not been vaccinated against Covid.  She is not hypoxic.  Her lungs are clear on exam.  She is not tachycardic or febrile.  There is no increased work of breathing.  She will be swabbed for Covid and she is advised, these results will be available, on MyChart in the next 24 hours.  Currently, she is not meeting criteria for monoclonal antibodies or, steroids.  She is discharged home in stable condition.    FINAL IMPRESSION  1. COVID-19 ruled out    2. Cough

## 2021-11-16 ENCOUNTER — OFFICE VISIT (OUTPATIENT)
Dept: MEDICAL GROUP | Facility: MEDICAL CENTER | Age: 31
End: 2021-11-16
Attending: FAMILY MEDICINE
Payer: MEDICAID

## 2021-11-16 VITALS
RESPIRATION RATE: 16 BRPM | DIASTOLIC BLOOD PRESSURE: 66 MMHG | HEART RATE: 64 BPM | HEIGHT: 62 IN | TEMPERATURE: 97.5 F | WEIGHT: 211 LBS | BODY MASS INDEX: 38.83 KG/M2 | SYSTOLIC BLOOD PRESSURE: 112 MMHG | OXYGEN SATURATION: 98 %

## 2021-11-16 DIAGNOSIS — F41.9 ANXIETY: ICD-10-CM

## 2021-11-16 DIAGNOSIS — E66.9 OBESITY (BMI 30-39.9): ICD-10-CM

## 2021-11-16 DIAGNOSIS — J39.2 THROAT IRRITATION: ICD-10-CM

## 2021-11-16 PROBLEM — E66.01 MORBID OBESITY DUE TO EXCESS CALORIES (HCC): Status: ACTIVE | Noted: 2021-11-16

## 2021-11-16 PROCEDURE — 99214 OFFICE O/P EST MOD 30 MIN: CPT | Performed by: FAMILY MEDICINE

## 2021-11-16 PROCEDURE — 99213 OFFICE O/P EST LOW 20 MIN: CPT | Performed by: FAMILY MEDICINE

## 2021-11-16 RX ORDER — BUSPIRONE HYDROCHLORIDE 5 MG/1
5 TABLET ORAL 3 TIMES DAILY
Qty: 90 TABLET | Refills: 6 | Status: SHIPPED | OUTPATIENT
Start: 2021-11-16 | End: 2023-03-25

## 2021-11-16 RX ORDER — ACYCLOVIR 400 MG/1
400 TABLET ORAL 2 TIMES DAILY
Qty: 20 TABLET | Refills: 2 | Status: SHIPPED | OUTPATIENT
Start: 2021-11-16

## 2021-11-16 RX ORDER — CETIRIZINE HYDROCHLORIDE 10 MG/1
10 TABLET ORAL DAILY
Qty: 30 TABLET | Refills: 6 | Status: SHIPPED | OUTPATIENT
Start: 2021-11-16

## 2021-11-16 RX ORDER — PHENTERMINE HYDROCHLORIDE 15 MG/1
15 CAPSULE ORAL EVERY MORNING
Qty: 30 CAPSULE | Refills: 0 | Status: SHIPPED | OUTPATIENT
Start: 2021-11-16 | End: 2021-12-16

## 2021-11-16 RX ORDER — SERTRALINE HYDROCHLORIDE 100 MG/1
100 TABLET, FILM COATED ORAL DAILY
Qty: 30 TABLET | Refills: 6 | Status: SHIPPED | OUTPATIENT
Start: 2021-11-16 | End: 2023-06-02 | Stop reason: SDUPTHER

## 2021-11-16 ASSESSMENT — FIBROSIS 4 INDEX: FIB4 SCORE: 0.38

## 2021-11-16 NOTE — PROGRESS NOTES
Subjective     Natalya Vira Salguero is a 31 y.o. female who presents with Anxiety (pt has recently restarted Buspar) and Oral Swelling (weird bumps and hole in throat)            HPI 1. Throat irritation-patient reports a 5-month history of fluctuating tickle or feeling of pressure only behind her left tonsil. This will improve if she either coughs clears her throat or takes a drink of water. Rarely will be gone for about 3 days and then return. Initially improved when she placed herself on Claritin but that benefit waned after 4 to 5 weeks of treatment. Patient notes some feelings of dryness in her nose morning running. Not much current sneezing.  2. Obesity-patient would like to try 15 mg dose of phentermine to assist with reducing her food intake. She experienced palpitations with the higher dose of phentermine and never did try the smaller dose. Not reporting any current palpitations or chest pain  3. Anxiety-patient reports she restarted BuSpar 5 mg three times a day along with 100 mg of sertraline about 2 months ago. She reports that has been significantly helpful in reducing daily anxiety levels. She is working at [employer]    ROS negative for choking, wheezing, unexplained hair loss, suicidal ideation  Social history-, working  Current medication-  Prior to Admission medications    Medication Sig Start Date End Date Taking? Authorizing Provider   phentermine 15 MG capsule Take 1 Capsule by mouth every morning for 30 days. 11/16/21 12/16/21 Yes Gaetano Keller M.D.   acyclovir (ZOVIRAX) 400 MG tablet Take 1 Tablet by mouth 2 times a day. 11/16/21  Yes Gaetano Keller M.D.   busPIRone (BUSPAR) 5 MG tablet Take 1 Tablet by mouth 3 times a day. 11/16/21  Yes Gaetano Keller M.D.   sertraline (ZOLOFT) 100 MG Tab Take 1 Tablet by mouth every day. 11/16/21  Yes Gaetano Keller M.D.   cetirizine (ZYRTEC) 10 MG Tab Take 1 Tablet by mouth every day. 11/16/21  Yes Gaetano Keller M.D.  "  loratadine (CLARITIN) 10 MG Tab Take 1 tablet by mouth every day. 7/13/21   Gaetano Keller M.D.   omeprazole (PRILOSEC) 20 MG delayed-release capsule Take 1 Cap by mouth every day. 2/28/20   Gaetano Keller M.D.              Objective     /66 (BP Location: Right arm, Patient Position: Sitting, BP Cuff Size: Large adult)   Pulse 64   Temp 36.4 °C (97.5 °F) (Temporal)   Resp 16   Ht 1.575 m (5' 2.01\")   Wt 95.7 kg (211 lb)   SpO2 98%   BMI 38.58 kg/m²      Physical Exam  Gen.- alert, cooperative, in no acute distress  Neck- midline trachea, thyroid not enlarged or tender,supple, no cervical adenopathy  Chest-clear to auscultation and percussion with normal breath sounds. No retractions. Chest wall nontender  Cardiac- regular rhythm and rate. No murmur, thrill, or heave  Oropharynx-clear pink moist mucosa. Tonsils are normal size. No exudate or mass is seen. Tongue is midline  Psych-normal affect with good eye contact. Normal grooming. Oriented x4.                        Assessment & Plan        1. Anxiety    - busPIRone (BUSPAR) 5 MG tablet; Take 1 Tablet by mouth 3 times a day.  Dispense: 90 Tablet; Refill: 6  - sertraline (ZOLOFT) 100 MG Tab; Take 1 Tablet by mouth every day.  Dispense: 30 Tablet; Refill: 6    2. Throat irritation    3. Obesity (BMI 30-39.9)    Plan: 1. Trial of Zyrtec 10 mg if not improving throat symptoms in 1 week would consider Kenalog injection and potentially ENT referral ()  2. Rx phentermine 15 mg one every morning  3. Renew BuSpar and sertraline  4. Follow-up with me in 1 month           "

## 2021-11-23 ENCOUNTER — PATIENT MESSAGE (OUTPATIENT)
Dept: MEDICAL GROUP | Facility: MEDICAL CENTER | Age: 31
End: 2021-11-23

## 2022-01-11 ENCOUNTER — PATIENT MESSAGE (OUTPATIENT)
Dept: MEDICAL GROUP | Facility: MEDICAL CENTER | Age: 32
End: 2022-01-11

## 2022-01-26 ENCOUNTER — NON-PROVIDER VISIT (OUTPATIENT)
Dept: MEDICAL GROUP | Facility: MEDICAL CENTER | Age: 32
End: 2022-01-26
Attending: INTERNAL MEDICINE
Payer: COMMERCIAL

## 2022-01-26 DIAGNOSIS — Z91.09 ENVIRONMENTAL ALLERGIES: ICD-10-CM

## 2022-01-26 PROCEDURE — 96372 THER/PROPH/DIAG INJ SC/IM: CPT | Performed by: INTERNAL MEDICINE

## 2022-01-26 PROCEDURE — 700111 HCHG RX REV CODE 636 W/ 250 OVERRIDE (IP): Performed by: INTERNAL MEDICINE

## 2022-01-26 RX ORDER — TRIAMCINOLONE ACETONIDE 40 MG/ML
40 INJECTION, SUSPENSION INTRA-ARTICULAR; INTRAMUSCULAR ONCE
Status: COMPLETED | OUTPATIENT
Start: 2022-01-26 | End: 2022-01-26

## 2022-01-26 RX ADMIN — TRIAMCINOLONE ACETONIDE 40 MG: 40 INJECTION, SUSPENSION INTRA-ARTICULAR; INTRAMUSCULAR at 12:14

## 2022-01-26 NOTE — NON-PROVIDER
Natalya Salguero is a 31 y.o. female here for a non-provider visit for kenolog  injection.    Reason for injection: kenolog ordered   Order in MAR?: Yes  Patient supplied?:No  Minimum interval has been met for this injection (per MAR order): Yes    Patient tolerated injection and no adverse effects were observed or reported: Yes    # of Administrations remaining in MAR: 0

## 2022-02-22 ENCOUNTER — PATIENT MESSAGE (OUTPATIENT)
Dept: MEDICAL GROUP | Facility: MEDICAL CENTER | Age: 32
End: 2022-02-22
Payer: MEDICAID

## 2022-02-22 DIAGNOSIS — J39.2 THROAT IRRITATION: ICD-10-CM

## 2022-03-10 ENCOUNTER — PATIENT MESSAGE (OUTPATIENT)
Dept: MEDICAL GROUP | Facility: MEDICAL CENTER | Age: 32
End: 2022-03-10
Payer: MEDICAID

## 2022-03-24 RX ORDER — BUSPIRONE HYDROCHLORIDE 5 MG/1
1 TABLET ORAL
COMMUNITY
Start: 2021-11-16 | End: 2023-03-25

## 2022-03-24 RX ORDER — IPRATROPIUM BROMIDE AND ALBUTEROL SULFATE 2.5; .5 MG/3ML; MG/3ML
SOLUTION RESPIRATORY (INHALATION)
COMMUNITY
Start: 2022-01-10 | End: 2023-03-25

## 2022-03-24 RX ORDER — FLUCONAZOLE 150 MG/1
TABLET ORAL
COMMUNITY
Start: 2022-01-12 | End: 2022-09-27

## 2022-03-24 RX ORDER — DEXTROMETHORPHAN HYDROBROMIDE AND PROMETHAZINE HYDROCHLORIDE 15; 6.25 MG/5ML; MG/5ML
SYRUP ORAL
COMMUNITY
Start: 2022-01-05 | End: 2023-03-25

## 2022-03-24 RX ORDER — PREDNISONE 10 MG/1
TABLET ORAL
COMMUNITY
Start: 2022-01-10 | End: 2022-09-27

## 2022-03-24 RX ORDER — AZITHROMYCIN 250 MG/1
TABLET, FILM COATED ORAL
COMMUNITY
Start: 2022-01-04 | End: 2022-09-27

## 2022-03-24 RX ORDER — ACYCLOVIR 400 MG/1
1 TABLET ORAL
COMMUNITY
Start: 2021-11-16 | End: 2023-03-25

## 2022-03-24 RX ORDER — BENZONATATE 200 MG/1
CAPSULE ORAL
COMMUNITY
Start: 2022-01-05 | End: 2023-03-25

## 2022-03-24 RX ORDER — CEFDINIR 300 MG/1
CAPSULE ORAL
COMMUNITY
Start: 2022-01-05 | End: 2022-09-27

## 2022-03-24 RX ORDER — ALBUTEROL SULFATE 90 UG/1
AEROSOL, METERED RESPIRATORY (INHALATION)
COMMUNITY
Start: 2022-01-05 | End: 2023-03-25

## 2022-03-29 ENCOUNTER — OFFICE VISIT (OUTPATIENT)
Dept: MEDICAL GROUP | Facility: MEDICAL CENTER | Age: 32
End: 2022-03-29
Attending: FAMILY MEDICINE
Payer: MEDICAID

## 2022-03-29 VITALS
WEIGHT: 211.6 LBS | DIASTOLIC BLOOD PRESSURE: 66 MMHG | TEMPERATURE: 97.9 F | RESPIRATION RATE: 16 BRPM | HEART RATE: 60 BPM | BODY MASS INDEX: 38.94 KG/M2 | SYSTOLIC BLOOD PRESSURE: 108 MMHG | OXYGEN SATURATION: 98 % | HEIGHT: 62 IN

## 2022-03-29 DIAGNOSIS — J02.9 PHARYNGITIS, UNSPECIFIED ETIOLOGY: ICD-10-CM

## 2022-03-29 PROCEDURE — 99213 OFFICE O/P EST LOW 20 MIN: CPT | Performed by: FAMILY MEDICINE

## 2022-03-29 PROCEDURE — 99212 OFFICE O/P EST SF 10 MIN: CPT | Performed by: FAMILY MEDICINE

## 2022-03-29 RX ORDER — HYDROXYZINE HYDROCHLORIDE 25 MG/1
25 TABLET, FILM COATED ORAL 3 TIMES DAILY PRN
Qty: 90 TABLET | Refills: 2 | Status: SHIPPED | OUTPATIENT
Start: 2022-03-29 | End: 2024-02-09

## 2022-03-29 NOTE — PROGRESS NOTES
"Subjective     Natalya Salguero is a 31 y.o. female who presents with Follow-Up (Throat itching, feels like there is something stuck behind tonsil)            HPI 1.  Pharyngeal pruritus-patient reports intensifying itching in the back of her throat particularly in and around her left tonsil over the past 6 to 8 months.  Patient reports no difficulty initiating or completing swallowing.  She is not reporting any heartburn although she does take omeprazole (previous history of perforated ulcer 2009 ).  Patient is not reporting any hives or diffuse rash.  Patient reports that Kenalog shot given in December was not really effective for reducing the level of pruritus in her throat it did help with other allergy symptoms.  She has taken Claritin and Zyrtec with no real impact on her throat symptom.    ROS positive for increased dyspnea with exertion after having Covid in January 2022.  No current wheezing or cough           Objective     /66   Pulse 60   Temp 36.6 °C (97.9 °F)   Resp 16   Ht 1.575 m (5' 2.01\")   Wt 96 kg (211 lb 9.6 oz)   SpO2 98%   BMI 38.69 kg/m²      Physical Exam      Gen.- alert, cooperative, in no acute distress  Neck- midline trachea, thyroid not enlarged or tender,supple, no cervical adenopathy  Chest-clear to auscultation and percussion with normal breath sounds. No retractions. Chest wall nontender  Cardiac- regular rhythm and rate. No murmur, thrill, or heave  Ears-normal TMs and canals bilaterally without obstruction or swelling  Oropharynx-clear pink moist mucosa.  Tonsils are moderate in size without exudates or deformity.  Dentition is intact                   Assessment & Plan        1. Pharyngitis, unspecified etiology      Plan: 1.  We will reattempt ENT referral consider [facility] as a possibility  2.  Trial of hydroxyzine 25 mg to 50 mg 3 times daily.  Drowsiness precautions reviewed           "

## 2022-04-22 ENCOUNTER — HOSPITAL ENCOUNTER (OUTPATIENT)
Dept: RADIOLOGY | Facility: MEDICAL CENTER | Age: 32
End: 2022-04-22
Attending: PHYSICIAN ASSISTANT
Payer: MEDICAID

## 2022-04-22 DIAGNOSIS — R10.13 ABDOMINAL PAIN, EPIGASTRIC: ICD-10-CM

## 2022-04-22 DIAGNOSIS — R10.11 RUQ PAIN: ICD-10-CM

## 2022-04-22 DIAGNOSIS — Z87.11 HISTORY OF PEPTIC ULCER DISEASE: ICD-10-CM

## 2022-04-22 DIAGNOSIS — R09.A2 GLOBUS SENSATION: ICD-10-CM

## 2022-04-22 DIAGNOSIS — R11.0 NAUSEA: ICD-10-CM

## 2022-04-22 PROCEDURE — 76705 ECHO EXAM OF ABDOMEN: CPT

## 2022-05-24 ENCOUNTER — PATIENT MESSAGE (OUTPATIENT)
Dept: MEDICAL GROUP | Facility: MEDICAL CENTER | Age: 32
End: 2022-05-24
Payer: MEDICAID

## 2022-06-09 ENCOUNTER — PATIENT MESSAGE (OUTPATIENT)
Dept: MEDICAL GROUP | Facility: MEDICAL CENTER | Age: 32
End: 2022-06-09
Payer: MEDICAID

## 2022-08-04 ENCOUNTER — PATIENT MESSAGE (OUTPATIENT)
Dept: MEDICAL GROUP | Facility: MEDICAL CENTER | Age: 32
End: 2022-08-04
Payer: MEDICAID

## 2022-09-27 ENCOUNTER — OFFICE VISIT (OUTPATIENT)
Dept: MEDICAL GROUP | Facility: MEDICAL CENTER | Age: 32
End: 2022-09-27
Attending: FAMILY MEDICINE
Payer: MEDICAID

## 2022-09-27 VITALS — OXYGEN SATURATION: 100 %

## 2022-09-27 DIAGNOSIS — R05.9 COUGH: ICD-10-CM

## 2022-09-27 DIAGNOSIS — E66.9 OBESITY (BMI 30-39.9): ICD-10-CM

## 2022-09-27 PROCEDURE — 99213 OFFICE O/P EST LOW 20 MIN: CPT | Performed by: FAMILY MEDICINE

## 2022-09-27 PROCEDURE — 99214 OFFICE O/P EST MOD 30 MIN: CPT | Performed by: FAMILY MEDICINE

## 2022-09-27 RX ORDER — BENZONATATE 100 MG/1
100 CAPSULE ORAL 3 TIMES DAILY PRN
Qty: 30 CAPSULE | Refills: 0 | Status: SHIPPED | OUTPATIENT
Start: 2022-09-27 | End: 2024-02-09

## 2022-09-27 RX ORDER — PHENTERMINE HYDROCHLORIDE 37.5 MG/1
37.5 TABLET ORAL EVERY MORNING
Qty: 30 TABLET | Refills: 0 | Status: SHIPPED | OUTPATIENT
Start: 2022-09-27 | End: 2022-10-27

## 2022-09-27 ASSESSMENT — PATIENT HEALTH QUESTIONNAIRE - PHQ9
9. THOUGHTS THAT YOU WOULD BE BETTER OFF DEAD, OR OF HURTING YOURSELF: NOT AT ALL
8. MOVING OR SPEAKING SO SLOWLY THAT OTHER PEOPLE COULD HAVE NOTICED. OR THE OPPOSITE, BEING SO FIGETY OR RESTLESS THAT YOU HAVE BEEN MOVING AROUND A LOT MORE THAN USUAL: NOT AT ALL
6. FEELING BAD ABOUT YOURSELF - OR THAT YOU ARE A FAILURE OR HAVE LET YOURSELF OR YOUR FAMILY DOWN: NOT AL ALL
SUM OF ALL RESPONSES TO PHQ QUESTIONS 1-9: 0
3. TROUBLE FALLING OR STAYING ASLEEP OR SLEEPING TOO MUCH: NOT AT ALL
7. TROUBLE CONCENTRATING ON THINGS, SUCH AS READING THE NEWSPAPER OR WATCHING TELEVISION: NOT AT ALL
4. FEELING TIRED OR HAVING LITTLE ENERGY: NOT AT ALL
1. LITTLE INTEREST OR PLEASURE IN DOING THINGS: NOT AT ALL
5. POOR APPETITE OR OVEREATING: NOT AT ALL
SUM OF ALL RESPONSES TO PHQ9 QUESTIONS 1 AND 2: 0
2. FEELING DOWN, DEPRESSED, IRRITABLE, OR HOPELESS: NOT AT ALL

## 2022-09-27 NOTE — PROGRESS NOTES
"Subjective     Ntaalya Salguero is a 31 y.o. female who presents with Medication Refill, Cough, and Referral Needed            HPI #1.  Obesity patient's restarted about a week ago on her residual amount of phentermine.  She is not reporting palpitations or chest pain.  She reports she is lost 2 pounds in the past week monitoring her weight at home.  She also has an upcoming appointment with one of the surgical [facility] at Dr. Chauhan's office for discussion of bariatric surgery.  2.  Mild cough-patient reports overnight a persistent dry cough developing.  She is not sure whether this is allergy or a early upper respiratory infection.  She is not having runny nose, headache, fever.    ROS negative for ear pain, sputum production, nausea           Objective     BP (P) 104/74 (BP Location: Left arm, Patient Position: Sitting, BP Cuff Size: Adult)   Pulse (P) 68   Temp (P) 36.6 °C (97.9 °F) (Temporal)   Resp (P) 16   Ht (P) 1.575 m (5' 2.01\")   Wt (P) 94.3 kg (208 lb)   SpO2 100%   BMI (P) 38.03 kg/m²      Physical Exam  Gen.- alert, cooperative, in no acute distress  Neck- midline trachea, thyroid not enlarged or tender,supple, no cervical adenopathy  Chest-clear to auscultation and percussion with normal breath sounds. No retractions. Chest wall nontender  Cardiac- regular rhythm and rate. No murmur, thrill, or heave  Oropharynx-clear pink moist mucosa without swelling redness or exudate.  Tongue is midline                      Assessment & Plan        1. Obesity (BMI 30-39.9)      2. Cough      Plan: 1.  May use Tessalon 100 mg up to 3 times daily to suppress cough  2.  Patient will contact us for potential reexam if she develops more acute infectious symptoms such as fever, sputum production  3.  Bariatric surgery referral placed-[facility]  4.  Follow-up visit with new provider, Marian Wall, in 1 month to follow-up on phentermine  5.  Phentermine 37.5 mg #30 prescribed             "

## 2022-10-25 ENCOUNTER — OFFICE VISIT (OUTPATIENT)
Dept: MEDICAL GROUP | Facility: MEDICAL CENTER | Age: 32
End: 2022-10-25
Attending: NURSE PRACTITIONER
Payer: MEDICAID

## 2022-10-25 VITALS
SYSTOLIC BLOOD PRESSURE: 110 MMHG | OXYGEN SATURATION: 98 % | DIASTOLIC BLOOD PRESSURE: 70 MMHG | RESPIRATION RATE: 18 BRPM | WEIGHT: 209.6 LBS | HEIGHT: 62 IN | TEMPERATURE: 96.9 F | HEART RATE: 54 BPM | BODY MASS INDEX: 38.57 KG/M2

## 2022-10-25 DIAGNOSIS — E66.9 OBESITY (BMI 30-39.9): ICD-10-CM

## 2022-10-25 PROCEDURE — 99213 OFFICE O/P EST LOW 20 MIN: CPT | Performed by: NURSE PRACTITIONER

## 2022-10-25 PROCEDURE — 99214 OFFICE O/P EST MOD 30 MIN: CPT | Performed by: NURSE PRACTITIONER

## 2022-10-25 NOTE — PROGRESS NOTES
No chief complaint on file.      Subjective:     HPI:   Natalya Salguero is a 31 y.o. female here to discuss the evaluation and management of:      Problem   Obesity (Bmi 30-39.9)    Patient here for follow-up.  Ended up picking up her phentermine late from the pharmacy last Tuesday.  Was going to reschedule for 2 weeks from now however she ended up losing her prescription after 4 days of taking the medication.         ROS  See HPI     Allergies   Allergen Reactions    Other Environmental      Adhesive tape causes blisters       Current medicines (including changes today)  Current Outpatient Medications   Medication Sig Dispense Refill    benzonatate (TESSALON) 100 MG Cap Take 1 Capsule by mouth 3 times a day as needed for Cough. 30 Capsule 0    phentermine (ADIPEX-P) 37.5 MG tablet Take 1 Tablet by mouth every morning for 30 days. 30 Tablet 0    hydrOXYzine HCl (ATARAX) 25 MG Tab Take 1 Tablet by mouth 3 times a day as needed for Itching. 90 Tablet 2    albuterol 108 (90 Base) MCG/ACT Aero Soln inhalation aerosol       benzonatate (TESSALON) 200 MG capsule       ipratropium-albuterol (DUONEB) 0.5-2.5 (3) MG/3ML nebulizer solution       promethazine-dextromethorphan (PROMETHAZINE-DM) 6.25-15 MG/5ML syrup       acyclovir (ZOVIRAX) 400 MG tablet Take 1 Tablet by mouth.      busPIRone (BUSPAR) 5 MG tablet Take 1 Tablet by mouth.      acyclovir (ZOVIRAX) 400 MG tablet Take 1 Tablet by mouth 2 times a day. 20 Tablet 2    busPIRone (BUSPAR) 5 MG tablet Take 1 Tablet by mouth 3 times a day. 90 Tablet 6    sertraline (ZOLOFT) 100 MG Tab Take 1 Tablet by mouth every day. 30 Tablet 6    cetirizine (ZYRTEC) 10 MG Tab Take 1 Tablet by mouth every day. 30 Tablet 6    loratadine (CLARITIN) 10 MG Tab Take 1 tablet by mouth every day. 30 tablet 6    omeprazole (PRILOSEC) 20 MG delayed-release capsule Take 1 Cap by mouth every day. 30 Cap 11     No current facility-administered medications for this visit.       Social History  "    Tobacco Use    Smoking status: Every Day     Packs/day: 0.12     Years: 6.00     Pack years: 0.72     Types: Cigarettes    Smokeless tobacco: Never   Substance Use Topics    Alcohol use: Yes     Alcohol/week: 0.0 oz     Comment: Socially    Drug use: No       Patient Active Problem List    Diagnosis Date Noted    Morbid obesity due to excess calories (HCC) 11/16/2021    Other fatigue 02/28/2020    Current moderate episode of major depressive disorder (HCC) 02/28/2020    Panic anxiety syndrome 02/28/2020    Chronic GERD 02/28/2020    Obesity (BMI 30-39.9) 11/04/2016    Arm numbness left 05/20/2016    Anxiety 05/20/2016    History of ulcer disease 05/20/2016       Family History   Problem Relation Age of Onset    Diabetes Mother     Hyperlipidemia Mother     Hypertension Mother     Heart Disease Mother     Hyperlipidemia Father     Cancer Maternal Grandmother         lung cancer          Objective:     /70 (BP Location: Left arm, Patient Position: Sitting, BP Cuff Size: Adult)   Pulse (!) 54   Temp 36.1 °C (96.9 °F) (Skin)   Resp 18   Ht 1.575 m (5' 2\")   Wt 95.1 kg (209 lb 9.6 oz)   SpO2 98%  Body mass index is 38.34 kg/m².    Physical Exam:  Physical Exam  Vitals reviewed.   Constitutional:       General: She is awake.      Appearance: Normal appearance. She is well-developed.   HENT:      Head: Normocephalic.   Eyes:      Conjunctiva/sclera: Conjunctivae normal.   Cardiovascular:      Rate and Rhythm: Normal rate.   Pulmonary:      Effort: Pulmonary effort is normal. No respiratory distress.   Musculoskeletal:      Cervical back: Neck supple.   Skin:     General: Skin is warm and dry.   Neurological:      Mental Status: She is alert and oriented to person, place, and time.   Psychiatric:         Mood and Affect: Mood normal.         Behavior: Behavior normal. Behavior is cooperative.            Assessment and Plan:     The following treatment plan was discussed:    Problem List Items Addressed This " Visit       Obesity (BMI 30-39.9)     Ongoing-  Discussed with patient that since this is a controlled substance I am unable to fill it early we will have to start over with her phentermine at her 30-day tianna.  Patient verbalized understanding and will make a follow-up appointment at that time.  Encourage patient to continue with diet and exercise until her next appointment.            Any change or worsening of signs or symptoms, patient encouraged to follow-up or report to emergency room for further evaluation. Patient verbalizes understanding and agrees.    Follow-Up: Return in about 3 weeks (around 11/15/2022) for Follow up weight.      PLEASE NOTE: This dictation was created using voice recognition software. I have made every reasonable attempt to correct obvious errors, but I expect that there are errors of grammar and possibly content that I did not discover before finalizing the note.

## 2022-10-25 NOTE — ASSESSMENT & PLAN NOTE
Ongoing-  Discussed with patient that since this is a controlled substance I am unable to fill it early we will have to start over with her phentermine at her 30-day tianna.  Patient verbalized understanding and will make a follow-up appointment at that time.  Encourage patient to continue with diet and exercise until her next appointment.

## 2022-11-15 ENCOUNTER — HOSPITAL ENCOUNTER (OUTPATIENT)
Dept: LAB | Facility: MEDICAL CENTER | Age: 32
End: 2022-11-15
Attending: NURSE PRACTITIONER
Payer: MEDICAID

## 2022-11-15 ENCOUNTER — OFFICE VISIT (OUTPATIENT)
Dept: MEDICAL GROUP | Facility: MEDICAL CENTER | Age: 32
End: 2022-11-15
Attending: NURSE PRACTITIONER
Payer: MEDICAID

## 2022-11-15 VITALS
WEIGHT: 211.6 LBS | DIASTOLIC BLOOD PRESSURE: 62 MMHG | OXYGEN SATURATION: 98 % | HEART RATE: 52 BPM | HEIGHT: 62 IN | BODY MASS INDEX: 38.94 KG/M2 | RESPIRATION RATE: 19 BRPM | TEMPERATURE: 97.4 F | SYSTOLIC BLOOD PRESSURE: 100 MMHG

## 2022-11-15 DIAGNOSIS — L98.9 SKIN LESION: ICD-10-CM

## 2022-11-15 DIAGNOSIS — Z13.29 SCREENING FOR ENDOCRINE, NUTRITIONAL, METABOLIC AND IMMUNITY DISORDER: ICD-10-CM

## 2022-11-15 DIAGNOSIS — Z13.21 SCREENING FOR ENDOCRINE, NUTRITIONAL, METABOLIC AND IMMUNITY DISORDER: ICD-10-CM

## 2022-11-15 DIAGNOSIS — E66.9 OBESITY (BMI 30-39.9): ICD-10-CM

## 2022-11-15 DIAGNOSIS — Z13.228 SCREENING FOR ENDOCRINE, NUTRITIONAL, METABOLIC AND IMMUNITY DISORDER: ICD-10-CM

## 2022-11-15 DIAGNOSIS — Z13.0 SCREENING FOR ENDOCRINE, NUTRITIONAL, METABOLIC AND IMMUNITY DISORDER: ICD-10-CM

## 2022-11-15 DIAGNOSIS — Z79.899 MEDICATION MANAGEMENT: ICD-10-CM

## 2022-11-15 LAB
25(OH)D3 SERPL-MCNC: 22 NG/ML (ref 30–100)
ALBUMIN SERPL BCP-MCNC: 4.3 G/DL (ref 3.2–4.9)
ALBUMIN/GLOB SERPL: 1.5 G/DL
ALP SERPL-CCNC: 73 U/L (ref 30–99)
ALT SERPL-CCNC: 33 U/L (ref 2–50)
ANION GAP SERPL CALC-SCNC: 13 MMOL/L (ref 7–16)
AST SERPL-CCNC: 23 U/L (ref 12–45)
BILIRUB SERPL-MCNC: 0.5 MG/DL (ref 0.1–1.5)
BUN SERPL-MCNC: 9 MG/DL (ref 8–22)
CALCIUM SERPL-MCNC: 9 MG/DL (ref 8.5–10.5)
CHLORIDE SERPL-SCNC: 105 MMOL/L (ref 96–112)
CHOLEST SERPL-MCNC: 180 MG/DL (ref 100–199)
CO2 SERPL-SCNC: 21 MMOL/L (ref 20–33)
CREAT SERPL-MCNC: 0.62 MG/DL (ref 0.5–1.4)
EST. AVERAGE GLUCOSE BLD GHB EST-MCNC: 111 MG/DL
GFR SERPLBLD CREATININE-BSD FMLA CKD-EPI: 121 ML/MIN/1.73 M 2
GLOBULIN SER CALC-MCNC: 2.8 G/DL (ref 1.9–3.5)
GLUCOSE SERPL-MCNC: 90 MG/DL (ref 65–99)
HBA1C MFR BLD: 5.5 % (ref 4–5.6)
HDLC SERPL-MCNC: 46 MG/DL
LDLC SERPL CALC-MCNC: 95 MG/DL
POTASSIUM SERPL-SCNC: 4.2 MMOL/L (ref 3.6–5.5)
PROT SERPL-MCNC: 7.1 G/DL (ref 6–8.2)
SODIUM SERPL-SCNC: 139 MMOL/L (ref 135–145)
T4 FREE SERPL-MCNC: 0.8 NG/DL (ref 0.93–1.7)
TRIGL SERPL-MCNC: 193 MG/DL (ref 0–149)
TSH SERPL DL<=0.005 MIU/L-ACNC: 2.33 UIU/ML (ref 0.38–5.33)

## 2022-11-15 PROCEDURE — 80053 COMPREHEN METABOLIC PANEL: CPT

## 2022-11-15 PROCEDURE — 99214 OFFICE O/P EST MOD 30 MIN: CPT | Performed by: NURSE PRACTITIONER

## 2022-11-15 PROCEDURE — 82306 VITAMIN D 25 HYDROXY: CPT

## 2022-11-15 PROCEDURE — 84443 ASSAY THYROID STIM HORMONE: CPT

## 2022-11-15 PROCEDURE — 36415 COLL VENOUS BLD VENIPUNCTURE: CPT

## 2022-11-15 PROCEDURE — 84439 ASSAY OF FREE THYROXINE: CPT

## 2022-11-15 PROCEDURE — 80061 LIPID PANEL: CPT

## 2022-11-15 PROCEDURE — 83036 HEMOGLOBIN GLYCOSYLATED A1C: CPT

## 2022-11-16 NOTE — ASSESSMENT & PLAN NOTE
Ongoing-  Discussed with patient that we need to get updated labs to ensure liver and kidney function is good-CMP ordered as well as A1c and thyroid labs  Controlled substance agreement signed  As long as labs are within normal limits we will prescribe phentermine for patient  Patient will follow up with me in 1 month  Encouraged good healthy diet and increased exercise

## 2022-11-16 NOTE — PROGRESS NOTES
No chief complaint on file.      Subjective:     HPI:   Natalya Salguero is a 32 y.o. female here to discuss the evaluation and management of:      Problem   Obesity (Bmi 30-39.9)    Patient here to discuss going on phentermine again.  Patient was previously prescribed it but then ended up losing her bottle and had to stop.  Patient states that she felt she had good outcomes with this in the past and would like to try again.  Patient states that at first she did have to break the tablet in half and only take half dose as full dose did give her palpitations.          ROS  See HPI     Allergies   Allergen Reactions    Other Environmental      Adhesive tape causes blisters       Current medicines (including changes today)  Current Outpatient Medications   Medication Sig Dispense Refill    benzonatate (TESSALON) 100 MG Cap Take 1 Capsule by mouth 3 times a day as needed for Cough. 30 Capsule 0    hydrOXYzine HCl (ATARAX) 25 MG Tab Take 1 Tablet by mouth 3 times a day as needed for Itching. 90 Tablet 2    albuterol 108 (90 Base) MCG/ACT Aero Soln inhalation aerosol       benzonatate (TESSALON) 200 MG capsule       ipratropium-albuterol (DUONEB) 0.5-2.5 (3) MG/3ML nebulizer solution       promethazine-dextromethorphan (PROMETHAZINE-DM) 6.25-15 MG/5ML syrup       acyclovir (ZOVIRAX) 400 MG tablet Take 1 Tablet by mouth.      busPIRone (BUSPAR) 5 MG tablet Take 1 Tablet by mouth.      acyclovir (ZOVIRAX) 400 MG tablet Take 1 Tablet by mouth 2 times a day. 20 Tablet 2    busPIRone (BUSPAR) 5 MG tablet Take 1 Tablet by mouth 3 times a day. 90 Tablet 6    sertraline (ZOLOFT) 100 MG Tab Take 1 Tablet by mouth every day. 30 Tablet 6    cetirizine (ZYRTEC) 10 MG Tab Take 1 Tablet by mouth every day. 30 Tablet 6    loratadine (CLARITIN) 10 MG Tab Take 1 tablet by mouth every day. 30 tablet 6    omeprazole (PRILOSEC) 20 MG delayed-release capsule Take 1 Cap by mouth every day. 30 Cap 11     No current facility-administered  "medications for this visit.       Social History     Tobacco Use    Smoking status: Every Day     Packs/day: 0.12     Years: 6.00     Pack years: 0.72     Types: Cigarettes    Smokeless tobacco: Never   Substance Use Topics    Alcohol use: Yes     Alcohol/week: 0.0 oz     Comment: Socially    Drug use: No       Patient Active Problem List    Diagnosis Date Noted    Morbid obesity due to excess calories (HCC) 11/16/2021    Other fatigue 02/28/2020    Current moderate episode of major depressive disorder (HCC) 02/28/2020    Panic anxiety syndrome 02/28/2020    Chronic GERD 02/28/2020    Obesity (BMI 30-39.9) 11/04/2016    Arm numbness left 05/20/2016    Anxiety 05/20/2016    History of ulcer disease 05/20/2016       Family History   Problem Relation Age of Onset    Diabetes Mother     Hyperlipidemia Mother     Hypertension Mother     Heart Disease Mother     Hyperlipidemia Father     Cancer Maternal Grandmother         lung cancer          Objective:     /62 (BP Location: Left arm, Patient Position: Sitting, BP Cuff Size: Adult)   Pulse (!) 52   Temp 36.3 °C (97.4 °F) (Skin)   Resp 19   Ht 1.575 m (5' 2\")   Wt 96 kg (211 lb 9.6 oz)   SpO2 98%  Body mass index is 38.7 kg/m².    Physical Exam:  Physical Exam  Vitals reviewed.   Constitutional:       General: She is awake.      Appearance: Normal appearance. She is well-developed and overweight.   HENT:      Head: Normocephalic.   Eyes:      Conjunctiva/sclera: Conjunctivae normal.   Cardiovascular:      Rate and Rhythm: Normal rate.   Pulmonary:      Effort: Pulmonary effort is normal. No respiratory distress.   Musculoskeletal:      Cervical back: Neck supple.   Skin:     General: Skin is warm and dry.   Neurological:      Mental Status: She is alert and oriented to person, place, and time.   Psychiatric:         Mood and Affect: Mood normal.         Behavior: Behavior normal. Behavior is cooperative.            Assessment and Plan:     The following " treatment plan was discussed:    Problem List Items Addressed This Visit       Obesity (BMI 30-39.9)     Ongoing-  Discussed with patient that we need to get updated labs to ensure liver and kidney function is good-CMP ordered as well as A1c and thyroid labs  Controlled substance agreement signed  As long as labs are within normal limits we will prescribe phentermine for patient  Patient will follow up with me in 1 month  Encouraged good healthy diet and increased exercise          Other Visit Diagnoses       Skin lesion        Relevant Orders    Referral to Dermatology    Screening for endocrine, nutritional, metabolic and immunity disorder        Relevant Orders    Lipid Profile    TSH    VITAMIN D,25 HYDROXY (DEFICIENCY)    FREE THYROXINE    Comp Metabolic Panel    Medication management        Relevant Orders    Controlled Substance Treatment Agreement            Any change or worsening of signs or symptoms, patient encouraged to follow-up or report to emergency room for further evaluation. Patient verbalizes understanding and agrees.    Follow-Up: Return in about 4 weeks (around 12/13/2022) for Follow up BP check with log.      PLEASE NOTE: This dictation was created using voice recognition software. I have made every reasonable attempt to correct obvious errors, but I expect that there are errors of grammar and possibly content that I did not discover before finalizing the note.

## 2022-11-17 DIAGNOSIS — E66.9 OBESITY (BMI 30-39.9): ICD-10-CM

## 2022-11-17 RX ORDER — PHENTERMINE HYDROCHLORIDE 37.5 MG/1
37.5 TABLET ORAL
Qty: 30 TABLET | Refills: 0 | Status: SHIPPED | OUTPATIENT
Start: 2022-11-17 | End: 2022-12-17

## 2022-12-20 ENCOUNTER — HOSPITAL ENCOUNTER (OUTPATIENT)
Facility: MEDICAL CENTER | Age: 32
End: 2022-12-20
Attending: NURSE PRACTITIONER
Payer: MEDICAID

## 2022-12-20 ENCOUNTER — OFFICE VISIT (OUTPATIENT)
Dept: MEDICAL GROUP | Facility: MEDICAL CENTER | Age: 32
End: 2022-12-20
Attending: NURSE PRACTITIONER
Payer: MEDICAID

## 2022-12-20 VITALS
OXYGEN SATURATION: 95 % | BODY MASS INDEX: 38.81 KG/M2 | DIASTOLIC BLOOD PRESSURE: 82 MMHG | HEIGHT: 62 IN | HEART RATE: 49 BPM | WEIGHT: 210.9 LBS | TEMPERATURE: 97.4 F | RESPIRATION RATE: 17 BRPM | SYSTOLIC BLOOD PRESSURE: 110 MMHG

## 2022-12-20 DIAGNOSIS — M25.562 ACUTE PAIN OF LEFT KNEE: ICD-10-CM

## 2022-12-20 DIAGNOSIS — N89.8 VAGINAL ITCHING: ICD-10-CM

## 2022-12-20 DIAGNOSIS — E66.9 OBESITY (BMI 30-39.9): ICD-10-CM

## 2022-12-20 DIAGNOSIS — E66.01 MORBID OBESITY DUE TO EXCESS CALORIES (HCC): ICD-10-CM

## 2022-12-20 LAB — AMBIGUOUS DTTM AMBI4: NORMAL

## 2022-12-20 PROCEDURE — 87660 TRICHOMONAS VAGIN DIR PROBE: CPT

## 2022-12-20 PROCEDURE — 87480 CANDIDA DNA DIR PROBE: CPT

## 2022-12-20 PROCEDURE — 99213 OFFICE O/P EST LOW 20 MIN: CPT | Performed by: NURSE PRACTITIONER

## 2022-12-20 PROCEDURE — 87510 GARDNER VAG DNA DIR PROBE: CPT

## 2022-12-20 PROCEDURE — 99214 OFFICE O/P EST MOD 30 MIN: CPT | Performed by: NURSE PRACTITIONER

## 2022-12-20 RX ORDER — PHENTERMINE HYDROCHLORIDE 37.5 MG/1
37.5 TABLET ORAL
Qty: 30 TABLET | Refills: 0 | Status: SHIPPED | OUTPATIENT
Start: 2022-12-20 | End: 2023-01-19

## 2022-12-20 RX ORDER — CYCLOBENZAPRINE HCL 10 MG
10 TABLET ORAL
Qty: 10 TABLET | Refills: 0 | Status: SHIPPED
Start: 2022-12-20 | End: 2023-03-25

## 2022-12-20 ASSESSMENT — FIBROSIS 4 INDEX: FIB4 SCORE: 0.43

## 2022-12-20 NOTE — ASSESSMENT & PLAN NOTE
Acute-  Will send for x-ray imaging of the knee to ensure there is no effusion or fracture.  Encourage patient to RICE  Ace wrap provided to help with compression and support  May use NSAIDs at home to help with pain and inflammation  Provided short course of muscle relaxers to help with sleep at night, secondary to muscle pain.

## 2022-12-20 NOTE — ASSESSMENT & PLAN NOTE
Ongoing-  We will continue with phentermine as patient is seen significant results  Controlled substance agreement is already on file  Patient will follow up with me in 1 month

## 2022-12-20 NOTE — PROGRESS NOTES
No chief complaint on file.      Subjective:     HPI:   Natalya Salguero is a 32 y.o. female here to discuss the evaluation and management of:      Problem   Acute Pain of Left Knee    Patient was up in the middle of the night a day or 2 ago and fell over a toy doll that was left on the floor.  She fell full weight on her left knee on hardwood floors.  Patient states that since that time she has felt like there is air or fluid inside of her knee and that it is going to move out of place when she stands on it for prolonged time.  Patient is often standing for her work and it is causing a little bit of problems.     Vaginal Itching    Patient states she has started to notice some vaginal itching again and feels like she may have BV.  Patient would like to swab in clinic if this is accurate.  Patient states she felt this way during the beginning of the month but it seemed to have cleared a little bit and now has reoccurred.     Obesity (Bmi 30-39.9)    Patient here for follow-up on her phentermine use.  Patient states that her scale at home is showing a little bit different than ours here in clinic and is showing her at 206lbs.  Patient states that even if she is not losing weight on the scale she is definitely noticing it in her clothing and her pants are significantly looser.  Patient is also been going to the gym and watching her diet closely.  Patient would like to continue on with the phentermine and states she is not having any ill effects on it.          ROS  See HPI     Allergies   Allergen Reactions    Other Environmental      Adhesive tape causes blisters       Current medicines (including changes today)  Current Outpatient Medications   Medication Sig Dispense Refill    phentermine (ADIPEX-P) 37.5 MG tablet Take 1 Tablet by mouth every morning before breakfast for 30 days. 30 Tablet 0    cyclobenzaprine (FLEXERIL) 10 mg Tab Take 1 Tablet by mouth at bedtime. 10 Tablet 0    benzonatate (TESSALON) 100 MG  Cap Take 1 Capsule by mouth 3 times a day as needed for Cough. 30 Capsule 0    hydrOXYzine HCl (ATARAX) 25 MG Tab Take 1 Tablet by mouth 3 times a day as needed for Itching. 90 Tablet 2    albuterol 108 (90 Base) MCG/ACT Aero Soln inhalation aerosol       benzonatate (TESSALON) 200 MG capsule       ipratropium-albuterol (DUONEB) 0.5-2.5 (3) MG/3ML nebulizer solution       promethazine-dextromethorphan (PROMETHAZINE-DM) 6.25-15 MG/5ML syrup       acyclovir (ZOVIRAX) 400 MG tablet Take 1 Tablet by mouth.      busPIRone (BUSPAR) 5 MG tablet Take 1 Tablet by mouth.      acyclovir (ZOVIRAX) 400 MG tablet Take 1 Tablet by mouth 2 times a day. 20 Tablet 2    busPIRone (BUSPAR) 5 MG tablet Take 1 Tablet by mouth 3 times a day. 90 Tablet 6    sertraline (ZOLOFT) 100 MG Tab Take 1 Tablet by mouth every day. 30 Tablet 6    cetirizine (ZYRTEC) 10 MG Tab Take 1 Tablet by mouth every day. 30 Tablet 6    loratadine (CLARITIN) 10 MG Tab Take 1 tablet by mouth every day. 30 tablet 6    omeprazole (PRILOSEC) 20 MG delayed-release capsule Take 1 Cap by mouth every day. 30 Cap 11     No current facility-administered medications for this visit.       Social History     Tobacco Use    Smoking status: Every Day     Packs/day: 0.12     Years: 6.00     Pack years: 0.72     Types: Cigarettes    Smokeless tobacco: Never   Substance Use Topics    Alcohol use: Yes     Alcohol/week: 0.0 oz     Comment: Socially    Drug use: No       Patient Active Problem List    Diagnosis Date Noted    Acute pain of left knee 12/20/2022    Vaginal itching 12/20/2022    Morbid obesity due to excess calories (HCC) 11/16/2021    Other fatigue 02/28/2020    Current moderate episode of major depressive disorder (HCC) 02/28/2020    Panic anxiety syndrome 02/28/2020    Chronic GERD 02/28/2020    Obesity (BMI 30-39.9) 11/04/2016    Arm numbness left 05/20/2016    Anxiety 05/20/2016    History of ulcer disease 05/20/2016       Family History   Problem Relation Age of  "Onset    Diabetes Mother     Hyperlipidemia Mother     Hypertension Mother     Heart Disease Mother     Hyperlipidemia Father     Cancer Maternal Grandmother         lung cancer          Objective:     /82 (BP Location: Left arm, Patient Position: Sitting, BP Cuff Size: Adult long)   Pulse (!) 49   Temp 36.3 °C (97.4 °F) (Temporal)   Resp 17   Ht 1.575 m (5' 2\")   Wt 95.7 kg (210 lb 14.4 oz)   SpO2 95%  Body mass index is 38.57 kg/m².    Physical Exam:  Physical Exam  Vitals reviewed.   Constitutional:       General: She is awake.      Appearance: Normal appearance. She is well-developed.   HENT:      Head: Normocephalic.   Eyes:      Conjunctiva/sclera: Conjunctivae normal.   Cardiovascular:      Rate and Rhythm: Normal rate.   Pulmonary:      Effort: Pulmonary effort is normal. No respiratory distress.   Musculoskeletal:      Cervical back: Neck supple.      Left knee: Swelling present.        Legs:       Comments: Small amount  bruising to area.    Skin:     General: Skin is warm and dry.   Neurological:      Mental Status: She is alert and oriented to person, place, and time.   Psychiatric:         Mood and Affect: Mood normal.         Behavior: Behavior normal. Behavior is cooperative.            Assessment and Plan:     The following treatment plan was discussed:    Problem List Items Addressed This Visit       Obesity (BMI 30-39.9)     Ongoing-  We will continue with phentermine as patient is seen significant results  Controlled substance agreement is already on file  Patient will follow up with me in 1 month         Relevant Medications    phentermine (ADIPEX-P) 37.5 MG tablet    Morbid obesity due to excess calories (HCC)    Relevant Medications    phentermine (ADIPEX-P) 37.5 MG tablet    Acute pain of left knee     Acute-  Will send for x-ray imaging of the knee to ensure there is no effusion or fracture.  Encourage patient to RICE  Ace wrap provided to help with compression and support  May " use NSAIDs at home to help with pain and inflammation  Provided short course of muscle relaxers to help with sleep at night, secondary to muscle pain.         Relevant Medications    cyclobenzaprine (FLEXERIL) 10 mg Tab    Other Relevant Orders    DX-KNEE COMPLETE 4+ LEFT    Vaginal itching     Acute-  Will have patient swab for BV/vaginal pathogens DNA panel today in clinic  Once results are in we will treat accordingly.         Relevant Orders    VAGINAL PATHOGENS DNA PANEL       Any change or worsening of signs or symptoms, patient encouraged to follow-up or report to emergency room for further evaluation. Patient verbalizes understanding and agrees.    Follow-Up: Return in about 4 weeks (around 1/17/2023) for Follow up weight.      PLEASE NOTE: This dictation was created using voice recognition software. I have made every reasonable attempt to correct obvious errors, but I expect that there are errors of grammar and possibly content that I did not discover before finalizing the note.

## 2022-12-20 NOTE — ASSESSMENT & PLAN NOTE
Acute-  Will have patient swab for BV/vaginal pathogens DNA panel today in clinic  Once results are in we will treat accordingly.

## 2022-12-21 DIAGNOSIS — N76.0 GARDNERELLA VAGINALIS INFECTION: ICD-10-CM

## 2022-12-21 DIAGNOSIS — B96.89 GARDNERELLA VAGINALIS INFECTION: ICD-10-CM

## 2022-12-21 LAB
CANDIDA DNA VAG QL PROBE+SIG AMP: NEGATIVE
G VAGINALIS DNA VAG QL PROBE+SIG AMP: POSITIVE
T VAGINALIS DNA VAG QL PROBE+SIG AMP: NEGATIVE

## 2022-12-21 RX ORDER — METRONIDAZOLE 500 MG/1
500 TABLET ORAL 2 TIMES DAILY
Qty: 14 TABLET | Refills: 0 | Status: SHIPPED | OUTPATIENT
Start: 2022-12-21 | End: 2022-12-28

## 2023-01-31 ENCOUNTER — APPOINTMENT (OUTPATIENT)
Dept: RADIOLOGY | Facility: MEDICAL CENTER | Age: 33
End: 2023-01-31
Attending: EMERGENCY MEDICINE
Payer: MEDICAID

## 2023-01-31 ENCOUNTER — HOSPITAL ENCOUNTER (EMERGENCY)
Facility: MEDICAL CENTER | Age: 33
End: 2023-01-31
Attending: EMERGENCY MEDICINE
Payer: MEDICAID

## 2023-01-31 VITALS
DIASTOLIC BLOOD PRESSURE: 60 MMHG | RESPIRATION RATE: 18 BRPM | WEIGHT: 207.01 LBS | OXYGEN SATURATION: 94 % | BODY MASS INDEX: 37.86 KG/M2 | HEART RATE: 53 BPM | SYSTOLIC BLOOD PRESSURE: 119 MMHG | TEMPERATURE: 97.4 F

## 2023-01-31 DIAGNOSIS — R10.31 RIGHT LOWER QUADRANT ABDOMINAL PAIN: ICD-10-CM

## 2023-01-31 LAB
ALBUMIN SERPL BCP-MCNC: 4.4 G/DL (ref 3.2–4.9)
ALBUMIN/GLOB SERPL: 1.6 G/DL
ALP SERPL-CCNC: 71 U/L (ref 30–99)
ALT SERPL-CCNC: 28 U/L (ref 2–50)
ANION GAP SERPL CALC-SCNC: 11 MMOL/L (ref 7–16)
AST SERPL-CCNC: 16 U/L (ref 12–45)
BASOPHILS # BLD AUTO: 0.6 % (ref 0–1.8)
BASOPHILS # BLD: 0.05 K/UL (ref 0–0.12)
BILIRUB SERPL-MCNC: 0.6 MG/DL (ref 0.1–1.5)
BUN SERPL-MCNC: 8 MG/DL (ref 8–22)
CALCIUM ALBUM COR SERPL-MCNC: 8.9 MG/DL (ref 8.5–10.5)
CALCIUM SERPL-MCNC: 9.2 MG/DL (ref 8.5–10.5)
CHLORIDE SERPL-SCNC: 102 MMOL/L (ref 96–112)
CO2 SERPL-SCNC: 22 MMOL/L (ref 20–33)
CREAT SERPL-MCNC: 0.65 MG/DL (ref 0.5–1.4)
EOSINOPHIL # BLD AUTO: 0.08 K/UL (ref 0–0.51)
EOSINOPHIL NFR BLD: 0.9 % (ref 0–6.9)
ERYTHROCYTE [DISTWIDTH] IN BLOOD BY AUTOMATED COUNT: 42.3 FL (ref 35.9–50)
GFR SERPLBLD CREATININE-BSD FMLA CKD-EPI: 120 ML/MIN/1.73 M 2
GLOBULIN SER CALC-MCNC: 2.8 G/DL (ref 1.9–3.5)
GLUCOSE SERPL-MCNC: 94 MG/DL (ref 65–99)
HCG SERPL QL: NEGATIVE
HCT VFR BLD AUTO: 43.2 % (ref 37–47)
HGB BLD-MCNC: 14.2 G/DL (ref 12–16)
IMM GRANULOCYTES # BLD AUTO: 0.04 K/UL (ref 0–0.11)
IMM GRANULOCYTES NFR BLD AUTO: 0.5 % (ref 0–0.9)
LIPASE SERPL-CCNC: 26 U/L (ref 11–82)
LYMPHOCYTES # BLD AUTO: 1.09 K/UL (ref 1–4.8)
LYMPHOCYTES NFR BLD: 12.4 % (ref 22–41)
MCH RBC QN AUTO: 29.7 PG (ref 27–33)
MCHC RBC AUTO-ENTMCNC: 32.9 G/DL (ref 33.6–35)
MCV RBC AUTO: 90.4 FL (ref 81.4–97.8)
MONOCYTES # BLD AUTO: 0.71 K/UL (ref 0–0.85)
MONOCYTES NFR BLD AUTO: 8 % (ref 0–13.4)
NEUTROPHILS # BLD AUTO: 6.85 K/UL (ref 2–7.15)
NEUTROPHILS NFR BLD: 77.6 % (ref 44–72)
NRBC # BLD AUTO: 0 K/UL
NRBC BLD-RTO: 0 /100 WBC
PLATELET # BLD AUTO: 273 K/UL (ref 164–446)
PMV BLD AUTO: 9.8 FL (ref 9–12.9)
POTASSIUM SERPL-SCNC: 4.2 MMOL/L (ref 3.6–5.5)
PROT SERPL-MCNC: 7.2 G/DL (ref 6–8.2)
RBC # BLD AUTO: 4.78 M/UL (ref 4.2–5.4)
SODIUM SERPL-SCNC: 135 MMOL/L (ref 135–145)
WBC # BLD AUTO: 8.8 K/UL (ref 4.8–10.8)

## 2023-01-31 PROCEDURE — 99284 EMERGENCY DEPT VISIT MOD MDM: CPT

## 2023-01-31 PROCEDURE — 700111 HCHG RX REV CODE 636 W/ 250 OVERRIDE (IP): Performed by: EMERGENCY MEDICINE

## 2023-01-31 PROCEDURE — 80053 COMPREHEN METABOLIC PANEL: CPT

## 2023-01-31 PROCEDURE — 84703 CHORIONIC GONADOTROPIN ASSAY: CPT

## 2023-01-31 PROCEDURE — 83690 ASSAY OF LIPASE: CPT

## 2023-01-31 PROCEDURE — 85025 COMPLETE CBC W/AUTO DIFF WBC: CPT

## 2023-01-31 PROCEDURE — 74177 CT ABD & PELVIS W/CONTRAST: CPT

## 2023-01-31 PROCEDURE — 700117 HCHG RX CONTRAST REV CODE 255: Performed by: EMERGENCY MEDICINE

## 2023-01-31 PROCEDURE — 96374 THER/PROPH/DIAG INJ IV PUSH: CPT

## 2023-01-31 PROCEDURE — A9270 NON-COVERED ITEM OR SERVICE: HCPCS | Performed by: EMERGENCY MEDICINE

## 2023-01-31 PROCEDURE — 700102 HCHG RX REV CODE 250 W/ 637 OVERRIDE(OP): Performed by: EMERGENCY MEDICINE

## 2023-01-31 PROCEDURE — 36415 COLL VENOUS BLD VENIPUNCTURE: CPT

## 2023-01-31 RX ORDER — ONDANSETRON 4 MG/1
4 TABLET, ORALLY DISINTEGRATING ORAL EVERY 6 HOURS PRN
Qty: 20 TABLET | Refills: 0 | Status: SHIPPED | OUTPATIENT
Start: 2023-01-31

## 2023-01-31 RX ORDER — ALUMINA, MAGNESIA, AND SIMETHICONE 2400; 2400; 240 MG/30ML; MG/30ML; MG/30ML
10 SUSPENSION ORAL 4 TIMES DAILY PRN
Qty: 560 ML | Refills: 0 | Status: SHIPPED | OUTPATIENT
Start: 2023-01-31 | End: 2024-02-09

## 2023-01-31 RX ORDER — ONDANSETRON 2 MG/ML
4 INJECTION INTRAMUSCULAR; INTRAVENOUS ONCE
Status: COMPLETED | OUTPATIENT
Start: 2023-01-31 | End: 2023-01-31

## 2023-01-31 RX ORDER — DICYCLOMINE HCL 20 MG
20 TABLET ORAL ONCE
Status: COMPLETED | OUTPATIENT
Start: 2023-01-31 | End: 2023-01-31

## 2023-01-31 RX ADMIN — ONDANSETRON 4 MG: 2 INJECTION INTRAMUSCULAR; INTRAVENOUS at 10:02

## 2023-01-31 RX ADMIN — IOHEXOL 100 ML: 350 INJECTION, SOLUTION INTRAVENOUS at 11:19

## 2023-01-31 RX ADMIN — DICYCLOMINE HYDROCHLORIDE 20 MG: 20 TABLET ORAL at 11:08

## 2023-01-31 RX ADMIN — LIDOCAINE HYDROCHLORIDE 30 ML: 20 SOLUTION OROPHARYNGEAL at 10:02

## 2023-01-31 ASSESSMENT — FIBROSIS 4 INDEX: FIB4 SCORE: 0.43

## 2023-01-31 NOTE — ED NOTES
Pt discharged home as ordered by erp. Pt instructed to follow up with their PCP and return here as need. Pt instructed on where to  RX and instructed no driving/drinking on pain medications. Pt verbalized understanding and left ambulating independently

## 2023-01-31 NOTE — ED PROVIDER NOTES
ED Provider Note    Scribed for Rohini Sarah M.D. by Anusha Francis. 2023, 9:35 AM.    Primary care provider: Gaetano Keller M.D.  Means of arrival: Walk-in  History obtained from:   History limited by:     CHIEF COMPLAINT  Chief Complaint   Patient presents with    Abdominal Pain    Diarrhea     R sided abd pain since .  Pt states diarrhea resolved but abd pain persists.       HPI/ROS  Natalya Salguero is a 32 y.o. female who presents to the Emergency Department for evaluation of right sided abdominal cramping onset 3 days ago. She endorses heavy drinking 4 nights ago.  The next day she started to have right-sided abdominal pain with diffuse associated cramping.  She had diarrhea initially as well but this has resolved.  She reports associated nausea.  Given her ongoing pain she came in for further evaluation.  She denies any allergies to medications. She has a history of stomach ulcers for which she is on omeprazole. She states on  she experienced heart burn after eating a granola bar. She has a surgical history of     EXTERNAL RECORDS REVIEWED  None      PAST MEDICAL HISTORY   has a past medical history of Anemia, Anesthesia, Anxiety, Chronic GERD (2020), Depression, Gastric ulcer, chronic, Pregnancy, Psychiatric problem, Ulcer (), and Ulcer (2009).    SURGICAL HISTORY   has a past surgical history that includes other abdominal surgery (); repeat c section (2017); laparoscopy (3/22/2010); primary c section (2012); and abdominal exploration (2009).    SOCIAL HISTORY  Social History     Tobacco Use    Smoking status: Every Day     Packs/day: 0.12     Years: 6.00     Pack years: 0.72     Types: Cigarettes    Smokeless tobacco: Never   Substance Use Topics    Alcohol use: Yes     Alcohol/week: 0.0 oz     Comment: Socially    Drug use: No      Social History     Substance and Sexual Activity   Drug Use No       FAMILY HISTORY  Family History    Problem Relation Age of Onset    Diabetes Mother     Hyperlipidemia Mother     Hypertension Mother     Heart Disease Mother     Hyperlipidemia Father     Cancer Maternal Grandmother         lung cancer       CURRENT MEDICATIONS  Home Medications    **Home medications have not yet been reviewed for this encounter**         ALLERGIES  Allergies   Allergen Reactions    Other Environmental      Adhesive tape causes blisters       PHYSICAL EXAM  VITAL SIGNS: /81   Pulse 64   Temp 36.3 °C (97.3 °F) (Temporal)   Resp 16   Wt 93.9 kg (207 lb 0.2 oz)   SpO2 97%   BMI 37.86 kg/m²   Vitals reviewed by myself.  Physical Exam  Nursing note and vitals reviewed.  Constitutional: Well-developed and well-nourished. No acute distress.   HENT: Head is normocephalic and atraumatic.  Eyes: extra-ocular movements intact  Cardiovascular: Regular rate and regular rhythm. No murmur heard.  Pulmonary/Chest: Breath sounds normal. No wheezes or rales.   Abdominal: Soft and non-tender. No distention.  Negative Sarah sign, negative McBurney's point tenderness  Musculoskeletal: Extremities exhibit normal range of motion without edema or tenderness.   Neurological: Awake and alert  Skin: Skin is warm and dry. No rash.        DIAGNOSTIC STUDIES:  LABS  Labs Reviewed   CBC WITH DIFFERENTIAL - Abnormal; Notable for the following components:       Result Value    MCHC 32.9 (*)     Neutrophils-Polys 77.60 (*)     Lymphocytes 12.40 (*)     All other components within normal limits   COMP METABOLIC PANEL   LIPASE   HCG QUAL SERUM   CORRECTED CALCIUM   ESTIMATED GFR   URINALYSIS,CULTURE IF INDICATED       All labs reviewed and independently interpreted by myself    RADIOLOGY    CT-ABDOMEN-PELVIS WITH   Final Result      1.  Normal CT scan of abdomen and pelvis with intravenous contrast.        The radiologist's interpretation of all radiological studies have been reviewed by me.    COURSE & MEDICAL DECISION MAKING    ED Observation Status?  No; Patient does not meet criteria for ED Observation.     INITIAL ASSESSMENT AND PLAN    Patient is a 32-year-old female who comes in for evaluation of abdominal pain.  Differential diagnosis includes gastritis, peptic ulcer disease, gastroenteritis, dehydration, appendicitis.  Diagnostic work-up includes labs and CT of the abdomen.       REASSESSMENTS     9:42 AM - Patient seen and examined at bedside.     11:48 AM - Patient was reevaluated at bedside. Discussed lab results and will be discharged. Patient is agreeable to the plan of care.      ER COURSE AND FINAL DISPOSITION   Patient's initial vitals are within normal limits, abdominal exam is relatively benign.  Patient is treated with Zofran, GI cocktail and Bentyl.  Labs returned and interpreted by myself, they are unremarkable.  There is no leukocytosis, she is not pregnant.  Lipase is within normal limits ruling out pancreatitis.  CT returns and demonstrates no acute abnormalities.  Therefore patient is advised that work-up at this time is reassuring.  She is advised to continue her omeprazole and provided with prescriptions for Maalox and Zofran.  She is then given strict return precautions and discharged in stable condition.    ADDITIONAL PROBLEM LIST AND RESOURCE UTILIZATION    Additional problems aside from the chief complaint that I have addressed: None    I have discussed management of the patient with the following physicians and TION's: None    Discussion of management with other [facility] or appropriate source(s): None    Escalation of care considered, and ultimately not performed: See above.     Barriers to care at this time, including but not limited to: None.     Decision tools and prescription drugs considered including, but not limited to: See above.    Please see review of records as noted above      The patient will return for new or worsening symptoms and is stable at the time of discharge.    DISPOSITION:  Patient will be discharged home in stable  condition.    FOLLOW UP:  Gaetano Keller M.D.  [address]  [address] [address]  [phone]            OUTPATIENT MEDICATIONS:  New Prescriptions    MAG HYDROX-AL HYDROX-SIMETH (MAALOX PLUS ES OR MYLANTA DS) 400-400-40 MG/5ML SUSPENSION    Take 10 mL by mouth 4 times a day as needed (abd pain, nausea).    ONDANSETRON (ZOFRAN ODT) 4 MG TABLET DISPERSIBLE    Take 1 Tablet by mouth every 6 hours as needed for Nausea/Vomiting.        FINAL IMPRESSION  1. Right lower quadrant abdominal pain          Anusha KATZ (Nicholasibtod), am scribing for, and in the presence of, Rohini Sarah M.D..    Electronically signed by: Anusha Francis (Saqib), 1/31/2023    IRohini M.D. personally performed the services described in this documentation, as scribed by Anusha Francis in my presence, and it is both accurate and complete.    The note accurately reflects work and decisions made by me.  Rohini Sarah M.D.  1/31/2023  4:41 PM

## 2023-01-31 NOTE — ED TRIAGE NOTES
Chief Complaint   Patient presents with    Abdominal Pain    Diarrhea     R sided abd pain since Sunday.  Pt states diarrhea resolved but abd pain persists.

## 2023-03-17 ENCOUNTER — APPOINTMENT (OUTPATIENT)
Dept: RADIOLOGY | Facility: MEDICAL CENTER | Age: 33
End: 2023-03-17
Attending: NURSE PRACTITIONER
Payer: MEDICAID

## 2023-03-21 ENCOUNTER — APPOINTMENT (OUTPATIENT)
Dept: RADIOLOGY | Facility: MEDICAL CENTER | Age: 33
End: 2023-03-21
Attending: NURSE PRACTITIONER
Payer: COMMERCIAL

## 2023-03-21 DIAGNOSIS — M25.562 ACUTE PAIN OF LEFT KNEE: ICD-10-CM

## 2023-03-21 PROCEDURE — 73564 X-RAY EXAM KNEE 4 OR MORE: CPT | Mod: LT

## 2023-03-22 DIAGNOSIS — M25.562 ACUTE PAIN OF LEFT KNEE: ICD-10-CM

## 2023-03-25 ENCOUNTER — HOSPITAL ENCOUNTER (EMERGENCY)
Facility: MEDICAL CENTER | Age: 33
End: 2023-03-25
Attending: EMERGENCY MEDICINE
Payer: COMMERCIAL

## 2023-03-25 VITALS
RESPIRATION RATE: 18 BRPM | TEMPERATURE: 98.5 F | BODY MASS INDEX: 38.5 KG/M2 | DIASTOLIC BLOOD PRESSURE: 74 MMHG | WEIGHT: 209.22 LBS | SYSTOLIC BLOOD PRESSURE: 130 MMHG | HEART RATE: 82 BPM | HEIGHT: 62 IN | OXYGEN SATURATION: 97 %

## 2023-03-25 DIAGNOSIS — J02.9 PHARYNGITIS, UNSPECIFIED ETIOLOGY: ICD-10-CM

## 2023-03-25 DIAGNOSIS — B97.89 ACUTE VIRAL SINUSITIS: ICD-10-CM

## 2023-03-25 DIAGNOSIS — J01.90 ACUTE VIRAL SINUSITIS: ICD-10-CM

## 2023-03-25 DIAGNOSIS — R05.1 ACUTE COUGH: ICD-10-CM

## 2023-03-25 LAB — S PYO DNA SPEC NAA+PROBE: NOT DETECTED

## 2023-03-25 PROCEDURE — 87651 STREP A DNA AMP PROBE: CPT

## 2023-03-25 PROCEDURE — 99283 EMERGENCY DEPT VISIT LOW MDM: CPT

## 2023-03-25 PROCEDURE — 94640 AIRWAY INHALATION TREATMENT: CPT

## 2023-03-25 PROCEDURE — 700101 HCHG RX REV CODE 250: Performed by: EMERGENCY MEDICINE

## 2023-03-25 RX ORDER — IPRATROPIUM BROMIDE 42 UG/1
2 SPRAY, METERED NASAL EVERY 6 HOURS PRN
Qty: 15 ML | Refills: 0 | Status: SHIPPED | OUTPATIENT
Start: 2023-03-25 | End: 2024-02-09

## 2023-03-25 RX ADMIN — ALBUTEROL SULFATE 2.5 MG: 2.5 SOLUTION RESPIRATORY (INHALATION) at 09:41

## 2023-03-25 ASSESSMENT — FIBROSIS 4 INDEX: FIB4 SCORE: 0.35

## 2023-03-25 NOTE — FLOWSHEET NOTE
03/25/23 0943   Respiratory Assessment   Level of Consciousness Alert   Chest Exam   Work Of Breathing / Effort Mild   Breath Sounds   RUL Breath Sounds Clear;Diminished   RML Breath Sounds Clear;Diminished   RLL Breath Sounds Clear;Diminished   NELLY Breath Sounds Clear;Diminished   LLL Breath Sounds Clear;Diminished   Secretions   Cough Moist;Congested   Oxygen   O2 (LPM) 0   FiO2% 21 %   O2 Delivery Device None - Room Air

## 2023-03-25 NOTE — DISCHARGE INSTRUCTIONS
Take ipratropium nasal for congestion and hydrocodone for pain and cough.  This appears to be a viral syndrome.  If not improving in 3 to 4 days follow-up with your doctor for consideration of antibiotics.  At this point it appears to be viral.  Return to the ER for shortness of breath or inability to swallow.  This is not expected.

## 2023-03-25 NOTE — ED NOTES
Patient discharged per order. Oral and written discharge instructions reviewed. Medications sent to home pharmacy. Opiate consent signed. All belongings accounted for and taken with patient. Questions answered, and patient agrees with discharge plan. Encouraged to follow up with PCP.

## 2023-03-25 NOTE — ED PROVIDER NOTES
ED Provider Note    CHIEF COMPLAINT  Chief Complaint   Patient presents with    Cold Symptoms     Patient ambulates to triage c/o cold like symptoms. Initially started as a sinus infection and has not gotten any better. No distress in triage.        LIMITATION TO HISTORY   Select: None    HPI    Natalya Salguero is a 32 y.o. female who presents to the Emergency Department for days of nasal congestion, sinus pain, headache and now severe sore throat.  She has cough but not short of breath.  No history of asthma.  Multiple individuals are ill at work.  She has not received a COVID-vaccine but is vaccinated for influenza.  She needs a viral testing.  Her main complaint is throat pain.  No ill contacts with strep.    OUTSIDE HISTORIAN(S):  Select: None    EXTERNAL RECORDS REVIEWED  Select: Other clinic visit progress note reviewed from 2022.  Patient was concerned about left knee pain vaginal itching and obesity.    REVIEW OF SYSTEMS  Pertinent positives include: Rhinorrhea sinus congestion headache, fever the day of onset.  Pertinent negatives include: Chest pain, shortness of breath, abdominal pain, vomiting, diarrhea, pregnancy, diabetes or immune compromise.      PAST MEDICAL HISTORY  Past Medical History:   Diagnosis Date    Anemia     Postpartum after 2     Anesthesia     anxiety when blue sheet covered face during c section    Anxiety     Chronic GERD 2020    Depression     Gastric ulcer, chronic     Pregnancy     Psychiatric problem     anxiety    Ulcer 2009    Ulcer 2009    perforated ulcer       FAMILY HISTORY  Family History   Problem Relation Age of Onset    Diabetes Mother     Hyperlipidemia Mother     Hypertension Mother     Heart Disease Mother     Hyperlipidemia Father     Cancer Maternal Grandmother         lung cancer       SOCIAL HISTORY  Social History     Tobacco Use    Smoking status: Every Day     Packs/day: 0.12     Years: 6.00     Pack years: 0.72     Types: Cigarettes     Smokeless tobacco: Never   Substance Use Topics    Alcohol use: Yes     Alcohol/week: 0.0 oz     Comment: Socially    Drug use: No     Social History     Substance and Sexual Activity   Drug Use No       SURGICAL HISTORY  Past Surgical History:   Procedure Laterality Date    REPEAT C SECTION  9/14/2017    Procedure: REPEAT C SECTION;  Surgeon: Thor Joseph M.D.;  Location: LABOR AND DELIVERY;  Service: Labor and Delivery    PRIMARY C SECTION  11/19/2012    Performed by Denise Winslow M.D. at LABOR AND DELIVERY    LAPAROSCOPY  3/22/2010    Performed by SAMANTHA ROCHA at SURGERY [facility]    ABDOMINAL EXPLORATION  09/2009    perforated stomach due to ulcers skin graft done.    OTHER ABDOMINAL SURGERY  2009    ulcer       CURRENT MEDICATIONS    Current Facility-Administered Medications:     albuterol (PROVENTIL) 2.5mg/0.5ml nebulizer solution 2.5 mg, 2.5 mg, Nebulization, Once, Keith Lopez M.D.    Current Outpatient Medications:     mag hydrox-al hydrox-simeth (MAALOX PLUS ES OR MYLANTA DS) 400-400-40 MG/5ML Suspension, Take 10 mL by mouth 4 times a day as needed (abd pain, nausea)., Disp: 560 mL, Rfl: 0    ondansetron (ZOFRAN ODT) 4 MG TABLET DISPERSIBLE, Take 1 Tablet by mouth every 6 hours as needed for Nausea/Vomiting., Disp: 20 Tablet, Rfl: 0    cyclobenzaprine (FLEXERIL) 10 mg Tab, Take 1 Tablet by mouth at bedtime., Disp: 10 Tablet, Rfl: 0    benzonatate (TESSALON) 100 MG Cap, Take 1 Capsule by mouth 3 times a day as needed for Cough., Disp: 30 Capsule, Rfl: 0    hydrOXYzine HCl (ATARAX) 25 MG Tab, Take 1 Tablet by mouth 3 times a day as needed for Itching., Disp: 90 Tablet, Rfl: 2    albuterol 108 (90 Base) MCG/ACT Aero Soln inhalation aerosol, , Disp: , Rfl:     benzonatate (TESSALON) 200 MG capsule, , Disp: , Rfl:     ipratropium-albuterol (DUONEB) 0.5-2.5 (3) MG/3ML nebulizer solution, , Disp: , Rfl:     promethazine-dextromethorphan (PROMETHAZINE-DM) 6.25-15 MG/5ML syrup, , Disp: ,  "Rfl:     acyclovir (ZOVIRAX) 400 MG tablet, Take 1 Tablet by mouth., Disp: , Rfl:     busPIRone (BUSPAR) 5 MG tablet, Take 1 Tablet by mouth., Disp: , Rfl:     acyclovir (ZOVIRAX) 400 MG tablet, Take 1 Tablet by mouth 2 times a day., Disp: 20 Tablet, Rfl: 2    busPIRone (BUSPAR) 5 MG tablet, Take 1 Tablet by mouth 3 times a day., Disp: 90 Tablet, Rfl: 6    sertraline (ZOLOFT) 100 MG Tab, Take 1 Tablet by mouth every day., Disp: 30 Tablet, Rfl: 6    cetirizine (ZYRTEC) 10 MG Tab, Take 1 Tablet by mouth every day., Disp: 30 Tablet, Rfl: 6    loratadine (CLARITIN) 10 MG Tab, Take 1 tablet by mouth every day., Disp: 30 tablet, Rfl: 6    omeprazole (PRILOSEC) 20 MG delayed-release capsule, Take 1 Cap by mouth every day., Disp: 30 Cap, Rfl: 11    ALLERGIES  Allergies   Allergen Reactions    Other Environmental      Adhesive tape causes blisters       PHYSICAL EXAM  VITAL SIGNS: /70   Pulse 82   Temp 36.9 °C (98.5 °F) (Temporal)   Resp (!) 23   Ht 1.575 m (5' 2\")   Wt 94.9 kg (209 lb 3.5 oz)   SpO2 96%   BMI 38.27 kg/m²   Reviewed and afebrile, tachypneic, no hypoxia room air  Constitutional: Well developed, Well nourished, well-appearing, elevated BMI, handling secretions.  HENT: Normocephalic, atraumatic, bilateral external ears normal, mild frontal and maxillary sinus tenderness.  No intraoral erythema, edema, exudate  Eyes: PERRLA, conjunctiva pink, no scleral icterus.   Cardiovascular: Regular rate and rhythm. No murmurs, rubs or gallops.  No dependent edema or calf tenderness  Respiratory: Lungs clear to auscultation bilaterally. No wheezes, rales, or rhonchi.  Abdominal:  Abdomen soft, non-tender, non distended. No rebound, or guarding.    Skin: No erythema, no rash. No wounds or bruising.      LABS Ordered and Reviewed by Me:  Results for orders placed or performed during the hospital encounter of 03/25/23   Group A Strep by PCR    Specimen: Throat   Result Value Ref Range    Group A Strep by PCR Not " Detected Not Detected         ED COURSE:    ED Observation Status? Yes; Patient placed in observation status at 8:10 AM 03/25/23     Observation plan is as follows: Trial of albuterol, strep test    INTERVENTIONS BY ME:  Medications   albuterol (PROVENTIL) 2.5mg/0.5ml nebulizer solution 2.5 mg (has no administration in time range)       Response on recheck: No change in cough.    8:10 AM - Patient reassessed and no evidence of wheeze or bronchospasm after albuterol    Patient discharged from ED observation at 10:07 AM 03/25/23  .    MEDICAL DECISION MAKING:  PROBLEMS EVALUATED THIS VISIT:  This patient presents with sinus congestion pain headache cough and sore throat.  She likely has a viral syndrome.  There is no evidence of benefit to albuterol.  She does not have strep throat.  She is tolerating secretions and airway is patent.  She has not been ill long enough for this to be likely to be a bacterial sinusitis and antibiotics are not indicated at this time    RISK:  Moderate given need for opiate analgesics and cough syrup and prescription decongestants     PLAN:  New Prescriptions    HYDROCODONE-ACETAMINOPHEN 2.5-108 MG/5ML (HYCET) 7.5-325 MG/15ML SOLUTION    Take 10 mL by mouth every four hours as needed for Moderate Pain or Cough for up to 4 days.    IPRATROPIUM (ATROVENT) 0.06 % SOLUTION    Administer 2 Sprays into affected nostril(S) every 6 hours as needed (rhinorrhea, congestion).       Prescription monitoring queried and score 0  Opiate risk tool utilized and patient low risk  Informed consent obtained for opiate analgesic  Indication opiate analgesic sinusitis and pharyngitis, cough    Return for really to swallow shortness of breath    Followup:  Follow-up with your primary at [facility] if not better in 3 to 4 days to consider antibiotics    CONDITION: Stable.     FINAL IMPRESSION  1. Acute viral sinusitis    2. Pharyngitis, unspecified etiology    3. Acute cough       ED Observation  Care    Electronically signed by: Keith Lopez M.D., 3/25/2023 8:10 AM

## 2023-03-25 NOTE — ED TRIAGE NOTES
"Chief Complaint   Patient presents with    Cold Symptoms     Patient ambulates to triage c/o cold like symptoms. Initially started as a sinus infection and has not gotten any better. No distress in triage.      /88   Pulse 87   Temp 36.9 °C (98.5 °F) (Temporal)   Resp 18   Ht 1.575 m (5' 2\")   Wt 94.9 kg (209 lb 3.5 oz)   SpO2 96%     Patient educated on ed triage process, instructed to notify staff of any new or worsening symptoms. Returned to ed lobby, apologized for wait times. Patient and staff wearing appropriate PPE   "

## 2023-06-02 DIAGNOSIS — F41.9 ANXIETY: ICD-10-CM

## 2023-06-02 RX ORDER — SERTRALINE HYDROCHLORIDE 100 MG/1
100 TABLET, FILM COATED ORAL DAILY
Qty: 30 TABLET | Refills: 1 | Status: SHIPPED | OUTPATIENT
Start: 2023-06-02

## 2023-07-18 ENCOUNTER — HOSPITAL ENCOUNTER (EMERGENCY)
Facility: MEDICAL CENTER | Age: 33
End: 2023-07-19
Attending: EMERGENCY MEDICINE
Payer: COMMERCIAL

## 2023-07-18 DIAGNOSIS — R10.13 EPIGASTRIC ABDOMINAL PAIN: ICD-10-CM

## 2023-07-18 LAB
ALBUMIN SERPL BCP-MCNC: 4.5 G/DL (ref 3.2–4.9)
ALBUMIN/GLOB SERPL: 1.7 G/DL
ALP SERPL-CCNC: 73 U/L (ref 30–99)
ALT SERPL-CCNC: 23 U/L (ref 2–50)
ANION GAP SERPL CALC-SCNC: 11 MMOL/L (ref 7–16)
AST SERPL-CCNC: 14 U/L (ref 12–45)
BASOPHILS # BLD AUTO: 0.8 % (ref 0–1.8)
BASOPHILS # BLD: 0.08 K/UL (ref 0–0.12)
BILIRUB SERPL-MCNC: 0.4 MG/DL (ref 0.1–1.5)
BUN SERPL-MCNC: 8 MG/DL (ref 8–22)
CALCIUM ALBUM COR SERPL-MCNC: 10 MG/DL (ref 8.5–10.5)
CALCIUM SERPL-MCNC: 10.4 MG/DL (ref 8.5–10.5)
CHLORIDE SERPL-SCNC: 102 MMOL/L (ref 96–112)
CO2 SERPL-SCNC: 22 MMOL/L (ref 20–33)
CREAT SERPL-MCNC: 0.56 MG/DL (ref 0.5–1.4)
EOSINOPHIL # BLD AUTO: 0.03 K/UL (ref 0–0.51)
EOSINOPHIL NFR BLD: 0.3 % (ref 0–6.9)
ERYTHROCYTE [DISTWIDTH] IN BLOOD BY AUTOMATED COUNT: 41.3 FL (ref 35.9–50)
GFR SERPLBLD CREATININE-BSD FMLA CKD-EPI: 124 ML/MIN/1.73 M 2
GLOBULIN SER CALC-MCNC: 2.7 G/DL (ref 1.9–3.5)
GLUCOSE SERPL-MCNC: 106 MG/DL (ref 65–99)
HCG SERPL QL: NEGATIVE
HCT VFR BLD AUTO: 40.4 % (ref 37–47)
HGB BLD-MCNC: 13.3 G/DL (ref 12–16)
IMM GRANULOCYTES # BLD AUTO: 0.04 K/UL (ref 0–0.11)
IMM GRANULOCYTES NFR BLD AUTO: 0.4 % (ref 0–0.9)
LIPASE SERPL-CCNC: 23 U/L (ref 11–82)
LYMPHOCYTES # BLD AUTO: 2.14 K/UL (ref 1–4.8)
LYMPHOCYTES NFR BLD: 20.6 % (ref 22–41)
MCH RBC QN AUTO: 29.1 PG (ref 27–33)
MCHC RBC AUTO-ENTMCNC: 32.9 G/DL (ref 32.2–35.5)
MCV RBC AUTO: 88.4 FL (ref 81.4–97.8)
MONOCYTES # BLD AUTO: 0.65 K/UL (ref 0–0.85)
MONOCYTES NFR BLD AUTO: 6.2 % (ref 0–13.4)
NEUTROPHILS # BLD AUTO: 7.47 K/UL (ref 1.82–7.42)
NEUTROPHILS NFR BLD: 71.7 % (ref 44–72)
NRBC # BLD AUTO: 0 K/UL
NRBC BLD-RTO: 0 /100 WBC (ref 0–0.2)
PLATELET # BLD AUTO: 349 K/UL (ref 164–446)
PMV BLD AUTO: 9.7 FL (ref 9–12.9)
POTASSIUM SERPL-SCNC: 4.2 MMOL/L (ref 3.6–5.5)
PROT SERPL-MCNC: 7.2 G/DL (ref 6–8.2)
RBC # BLD AUTO: 4.57 M/UL (ref 4.2–5.4)
SODIUM SERPL-SCNC: 135 MMOL/L (ref 135–145)
WBC # BLD AUTO: 10.4 K/UL (ref 4.8–10.8)

## 2023-07-18 PROCEDURE — 83690 ASSAY OF LIPASE: CPT

## 2023-07-18 PROCEDURE — 99285 EMERGENCY DEPT VISIT HI MDM: CPT

## 2023-07-18 PROCEDURE — 36415 COLL VENOUS BLD VENIPUNCTURE: CPT

## 2023-07-18 PROCEDURE — 84703 CHORIONIC GONADOTROPIN ASSAY: CPT

## 2023-07-18 PROCEDURE — 80053 COMPREHEN METABOLIC PANEL: CPT

## 2023-07-18 PROCEDURE — 85025 COMPLETE CBC W/AUTO DIFF WBC: CPT

## 2023-07-18 ASSESSMENT — FIBROSIS 4 INDEX: FIB4 SCORE: 0.35

## 2023-07-19 ENCOUNTER — APPOINTMENT (OUTPATIENT)
Dept: RADIOLOGY | Facility: MEDICAL CENTER | Age: 33
End: 2023-07-19
Attending: EMERGENCY MEDICINE
Payer: COMMERCIAL

## 2023-07-19 VITALS
RESPIRATION RATE: 18 BRPM | HEIGHT: 62 IN | SYSTOLIC BLOOD PRESSURE: 112 MMHG | TEMPERATURE: 97 F | WEIGHT: 209 LBS | DIASTOLIC BLOOD PRESSURE: 68 MMHG | HEART RATE: 51 BPM | BODY MASS INDEX: 38.46 KG/M2 | OXYGEN SATURATION: 91 %

## 2023-07-19 LAB
APPEARANCE UR: ABNORMAL
BACTERIA #/AREA URNS HPF: ABNORMAL /HPF
BILIRUB UR QL STRIP.AUTO: NEGATIVE
COLOR UR: YELLOW
EKG IMPRESSION: NORMAL
EPI CELLS #/AREA URNS HPF: ABNORMAL /HPF
GLUCOSE UR STRIP.AUTO-MCNC: NEGATIVE MG/DL
HYALINE CASTS #/AREA URNS LPF: ABNORMAL /LPF
KETONES UR STRIP.AUTO-MCNC: NEGATIVE MG/DL
LEUKOCYTE ESTERASE UR QL STRIP.AUTO: ABNORMAL
MICRO URNS: ABNORMAL
NITRITE UR QL STRIP.AUTO: NEGATIVE
PH UR STRIP.AUTO: 7.5 [PH] (ref 5–8)
PROT UR QL STRIP: NEGATIVE MG/DL
RBC # URNS HPF: ABNORMAL /HPF
RBC UR QL AUTO: ABNORMAL
SP GR UR STRIP.AUTO: 1.01
UROBILINOGEN UR STRIP.AUTO-MCNC: 0.2 MG/DL
WBC #/AREA URNS HPF: ABNORMAL /HPF

## 2023-07-19 PROCEDURE — 700105 HCHG RX REV CODE 258: Performed by: EMERGENCY MEDICINE

## 2023-07-19 PROCEDURE — 93005 ELECTROCARDIOGRAM TRACING: CPT | Performed by: EMERGENCY MEDICINE

## 2023-07-19 PROCEDURE — A9270 NON-COVERED ITEM OR SERVICE: HCPCS | Performed by: EMERGENCY MEDICINE

## 2023-07-19 PROCEDURE — 74177 CT ABD & PELVIS W/CONTRAST: CPT

## 2023-07-19 PROCEDURE — 96375 TX/PRO/DX INJ NEW DRUG ADDON: CPT

## 2023-07-19 PROCEDURE — 96374 THER/PROPH/DIAG INJ IV PUSH: CPT

## 2023-07-19 PROCEDURE — 700102 HCHG RX REV CODE 250 W/ 637 OVERRIDE(OP): Performed by: EMERGENCY MEDICINE

## 2023-07-19 PROCEDURE — 700111 HCHG RX REV CODE 636 W/ 250 OVERRIDE (IP): Mod: JZ | Performed by: EMERGENCY MEDICINE

## 2023-07-19 PROCEDURE — 81001 URINALYSIS AUTO W/SCOPE: CPT

## 2023-07-19 PROCEDURE — 700117 HCHG RX CONTRAST REV CODE 255: Performed by: EMERGENCY MEDICINE

## 2023-07-19 RX ORDER — SUCRALFATE ORAL 1 G/10ML
1 SUSPENSION ORAL EVERY 6 HOURS PRN
Qty: 414 ML | Refills: 0 | Status: SHIPPED | OUTPATIENT
Start: 2023-07-19

## 2023-07-19 RX ORDER — ONDANSETRON 2 MG/ML
4 INJECTION INTRAMUSCULAR; INTRAVENOUS ONCE
Status: DISCONTINUED | OUTPATIENT
Start: 2023-07-19 | End: 2023-07-19

## 2023-07-19 RX ORDER — SODIUM CHLORIDE 9 MG/ML
1000 INJECTION, SOLUTION INTRAVENOUS ONCE
Status: COMPLETED | OUTPATIENT
Start: 2023-07-19 | End: 2023-07-19

## 2023-07-19 RX ORDER — METOCLOPRAMIDE HYDROCHLORIDE 5 MG/ML
10 INJECTION INTRAMUSCULAR; INTRAVENOUS ONCE
Status: COMPLETED | OUTPATIENT
Start: 2023-07-19 | End: 2023-07-19

## 2023-07-19 RX ADMIN — MORPHINE SULFATE 6 MG: 10 INJECTION INTRAVENOUS at 00:40

## 2023-07-19 RX ADMIN — LIDOCAINE HYDROCHLORIDE 30 ML: 20 SOLUTION OROPHARYNGEAL at 01:27

## 2023-07-19 RX ADMIN — METOCLOPRAMIDE 10 MG: 5 INJECTION, SOLUTION INTRAMUSCULAR; INTRAVENOUS at 00:40

## 2023-07-19 RX ADMIN — SODIUM CHLORIDE 1000 ML: 9 INJECTION, SOLUTION INTRAVENOUS at 00:40

## 2023-07-19 RX ADMIN — IOHEXOL 100 ML: 350 INJECTION, SOLUTION INTRAVENOUS at 01:16

## 2023-07-19 RX ADMIN — FAMOTIDINE 20 MG: 10 INJECTION, SOLUTION INTRAVENOUS at 00:40

## 2023-07-19 NOTE — ED TRIAGE NOTES
Chief Complaint   Patient presents with    Abdominal Pain     Pt c/o of generalized burning abdominal pain that began today, s/s nausea, vomiting pt reports blood in vomit.      Pt ambulatory to triage for above complaint. Pt reports taking 2 Zofran and Prilosec at home with no relief of nausea or burning sensation. Hx of ulcers.      Pt is alert/oriented and follows commands. Pt speaking in full sentences and responds appropriately to questions. No acute distress noted in triage and respirations are even and unlabored.     Pt placed in lobby and educated on triage process. Pt encouraged to alert staff for any changes in condition.    
110.2

## 2023-07-19 NOTE — DISCHARGE INSTRUCTIONS
You were seen in the Emergency Department for abdominal pain likely related to gastritis.    EKG, labs, CT scan were completed without significant acute abnormalities.    Please use 1,000mg of tylenol every 6 hours as needed for pain.  Avoid NSAIDs such as ibuprofen, spicy food, alcohol.  Continue omeprazole daily.    Please follow up with your primary care physician.    Return to the Emergency Department with worsening pain, persistent vomiting, or other concerns.

## 2023-07-19 NOTE — ED PROVIDER NOTES
ED Provider Note    CHIEF COMPLAINT  Chief Complaint   Patient presents with    Abdominal Pain     Pt c/o of generalized burning abdominal pain that began today, s/s nausea, vomiting pt reports blood in vomit.      EXTERNAL RECORDS REVIEWED  Patient was seen in the emergency department 2023 for sinusitis.  Seen prior to that in 2023 for abdominal pain.    HPI/ROS  LIMITATION TO HISTORY   Select: : None  OUTSIDE HISTORIAN(S):  None    Natalya Salguero is a 32 y.o. female who presents to the Emergency Department with severe epigastric abdominal pain.  Patient states symptoms started this afternoon after eating lemon garlic wings from [employer].  She reports a severe burning pain in the epigastric area.  She has had associated dry heaves with some pinkish color however no blood.  No diarrhea, patient feels constipated.  No urinary symptoms.  She does have a history of significant acid reflux disease and takes omeprazole daily.  She attempted antacids today without relief.  She does have a history of a perforated ulcer in  that required surgery.  She is also had a .  No other abdominal surgeries.    PAST MEDICAL HISTORY  Past Medical History:   Diagnosis Date    Anemia     Postpartum after 2     Anesthesia     anxiety when blue sheet covered face during c section    Anxiety     Chronic GERD 2020    Depression     Gastric ulcer, chronic     Pregnancy     Psychiatric problem     anxiety    Ulcer 2009    Ulcer 2009    perforated ulcer        SURGICAL HISTORY  Past Surgical History:   Procedure Laterality Date    REPEAT C SECTION  2017    Procedure: REPEAT C SECTION;  Surgeon: Thor Joseph M.D.;  Location: LABOR AND DELIVERY;  Service: Labor and Delivery    PRIMARY C SECTION  2012    Performed by Denise Winslow M.D. at LABOR AND DELIVERY    LAPAROSCOPY  3/22/2010    Performed by SAMANTHA ROCHA at SURGERY [facility]    ABDOMINAL EXPLORATION  2009     perforated stomach due to ulcers skin graft done.    OTHER ABDOMINAL SURGERY  2009    ulcer        FAMILY HISTORY  Family History   Problem Relation Age of Onset    Diabetes Mother     Hyperlipidemia Mother     Hypertension Mother     Heart Disease Mother     Hyperlipidemia Father     Cancer Maternal Grandmother         lung cancer       SOCIAL HISTORY   reports that she has been smoking cigarettes. She has a 0.72 pack-year smoking history. She has never used smokeless tobacco. She reports current alcohol use. She reports that she does not use drugs.    CURRENT MEDICATIONS  Discharge Medication List as of 7/19/2023  2:09 AM        CONTINUE these medications which have NOT CHANGED    Details   sertraline (ZOLOFT) 100 MG Tab Take 1 Tablet by mouth every day., Disp-30 Tablet, R-1, Normal      ipratropium (ATROVENT) 0.06 % Solution Administer 2 Sprays into affected nostril(S) every 6 hours as needed (rhinorrhea, congestion)., Disp-15 mL, R-0, Normal      mag hydrox-al hydrox-simeth (MAALOX PLUS ES OR MYLANTA DS) 400-400-40 MG/5ML Suspension Take 10 mL by mouth 4 times a day as needed (abd pain, nausea)., Disp-560 mL, R-0, Normal      ondansetron (ZOFRAN ODT) 4 MG TABLET DISPERSIBLE Take 1 Tablet by mouth every 6 hours as needed for Nausea/Vomiting., Disp-20 Tablet, R-0, Normal      benzonatate (TESSALON) 100 MG Cap Take 1 Capsule by mouth 3 times a day as needed for Cough., Disp-30 Capsule, R-0, Normal      hydrOXYzine HCl (ATARAX) 25 MG Tab Take 1 Tablet by mouth 3 times a day as needed for Itching., Disp-90 Tablet, R-2, Normal      acyclovir (ZOVIRAX) 400 MG tablet Take 1 Tablet by mouth 2 times a day., Disp-20 Tablet, R-2, Normal      cetirizine (ZYRTEC) 10 MG Tab Take 1 Tablet by mouth every day., Disp-30 Tablet, R-6, Normal      omeprazole (PRILOSEC) 20 MG delayed-release capsule Take 1 Cap by mouth every day., Disp-30 Cap, R-11, Print Rx Paper             ALLERGIES  Other environmental    PHYSICAL EXAM  BP  "112/68   Pulse (!) 51   Temp 36.1 °C (97 °F)   Resp 18   Ht 1.575 m (5' 2\")   Wt 94.8 kg (208 lb 15.9 oz)   SpO2 91%      Constitutional: Nontoxic appearing. Alert in no apparent distress.  HENT: Normocephalic, Atraumatic. Bilateral external ears normal. Nose normal.  Moist mucous membranes.  Oropharynx clear.  Eyes: Pupils are equal and reactive. Conjunctiva normal.   Neck: Supple, full range of motion  Heart: Regular rate and rhythm.  No murmurs.    Lungs: No respiratory distress, normal work of breathing. Lungs clear to auscultation bilaterally.  Abdomen Soft, no distention.  Epigastric tenderness to palpation.  Musculoskeletal: Atraumatic. No obvious deformities noted.  No lower extremity edema.  Skin: Warm, Dry.  No erythema, No rash.   Neurologic: Alert and oriented x3. Moving all extremities spontaneously without focal deficits.  Psychiatric: Affect normal, Mood normal, Appears appropriate and not intoxicated.      DIAGNOSTIC STUDIES / PROCEDURES    EKG  I have independently interpreted this EKG  Results for orders placed or performed during the hospital encounter of 23   EKG (Now)   Result Value Ref Range    Report       [facility] Emergency Dept.    Test Date:  2023  Pt Name:    CARO GUZMAN                Department: ER  MRN:        [id number]                      Room:        15  Gender:     Female                       Technician: [id number]  :        1990                   Requested By:LEIA VALADEZ  Order #:    [id number]                    Reading MD: Leia Valadez MD    Measurements  Intervals                                Axis  Rate:       52                           P:          6  DC:         147                          QRS:        16  QRSD:       97                           T:          14  QT:         511  QTc:        476    Interpretive Statements  Sinus bradycardia  RSR' in V1 or V2, probably normal variant  Prolonged QT interval  No ST or T wave " change  Compared to ECG 03/27/2021 10:41:01  Sinus rhythm no longer present  Electronically Signed On 07- 01:18:26 PDT by Leia Ignacio MD         LABS  Labs Reviewed   CBC WITH DIFFERENTIAL - Abnormal; Notable for the following components:       Result Value    Lymphocytes 20.60 (*)     Neutrophils (Absolute) 7.47 (*)     All other components within normal limits   COMP METABOLIC PANEL - Abnormal; Notable for the following components:    Glucose 106 (*)     All other components within normal limits   URINALYSIS,CULTURE IF INDICATED - Abnormal; Notable for the following components:    Character Cloudy (*)     Leukocyte Esterase Moderate (*)     Occult Blood Small (*)     All other components within normal limits    Narrative:     Indication for culture:->Patient WITHOUT an indwelling Girard  catheter in place with new onset of Dysuria, Frequency,  Urgency, and/or Suprapubic pain   URINE MICROSCOPIC (W/UA) - Abnormal; Notable for the following components:    WBC 20-50 (*)     Bacteria Moderate (*)     Epithelial Cells Moderate (*)     Hyaline Cast 3-5 (*)     All other components within normal limits    Narrative:     Indication for culture:->Patient WITHOUT an indwelling Girard  catheter in place with new onset of Dysuria, Frequency,  Urgency, and/or Suprapubic pain   LIPASE   HCG QUAL SERUM   CORRECTED CALCIUM   ESTIMATED GFR         RADIOLOGY  I have independently interpreted the diagnostic imaging associated with this visit and am waiting the final reading from the radiologist.   My preliminary interpretation is a follows: no free air  Radiologist interpretation:   CT-ABDOMEN-PELVIS WITH   Final Result         1.  No acute intra-abdominal abnormality identified.   2.  Hepatomegaly            COURSE & MEDICAL DECISION MAKING    ED Observation Status? Yes; I am placing the patient in to an observation status due to a diagnostic uncertainty as well as therapeutic intensity. Patient placed in observation status  at 12:45 AM, 7/19/2023.     Observation plan is as follows: ekg, labs, ct scan    Upon Reevaluation, the patient's condition has: Improved; and will be discharged.    Patient discharged from ED Observation status at 1:50 AM (Time) 07/19/23 (Date).     INITIAL ASSESSMENT, COURSE AND PLAN  Care Narrative: Young patient with history of GERD and prior perforated ulcer presents with acute onset of epigastric abdominal pain after eating wings today.  She is afebrile, mildly bradycardic on arrival with otherwise reassuring vital signs.  EKG shows sinus bradycardia without ischemia or arrhythmia.  Labs are reassuring without leukocytosis, transaminitis, elevated lipase concerning for pancreatitis.  Patient is not pregnant.  Urinalysis shows some white blood cells however appears possibly contaminated and patient is not having symptoms therefore will not place on antibiotics.  Imaging was performed due to history of perforated ulcer however is normal without concern for free air, bowel obstruction, cholecystitis, appendicitis.  We will plan to treat symptomatically followed by reassessment    1:44AM - Upon reassessment, patient is resting comfortably with stable vital signs.  No new complaints at this time.  Symptoms improved significantly following GI cocktail.  Discussed results with patient and/or family as well as importance of primary care follow up.  Patient understands plan of care and strict return precautions for new or changing symptoms.       ADDITIONAL PROBLEM LIST  Problem #1: Acute epigastric abdominal pain -likely due to gastritis, continue home omeprazole and will add additional Carafate as needed, given diet recommendations      DISPOSITION AND DISCUSSIONS  Barriers to care at this time, including but not limited to: Patient does not have established PCP.     Decision tools and prescription drugs considered including, but not limited to: Pain Medications over-the-counter medication should be sufficient  .        DISPOSITION:  Patient will be discharged home in stable condition.    FOLLOW UP:  [facility]  [address] [facility] [address] [phone]  Call   to establish primary care physician    [facility], Emergency Dept  [address] [facility] [address] [phone]    If symptoms worsen      OUTPATIENT MEDICATIONS:  Discharge Medication List as of 7/19/2023  2:09 AM        START taking these medications    Details   sucralfate (CARAFATE) 1 GM/10ML Suspension Take 10 mL by mouth every 6 hours as needed (abdominal pain)., Disp-414 mL, R-0, Normal               FINAL DIAGNOSIS  1. Epigastric abdominal pain

## 2024-02-02 ENCOUNTER — APPOINTMENT (OUTPATIENT)
Dept: RADIOLOGY | Facility: MEDICAL CENTER | Age: 34
End: 2024-02-02
Attending: STUDENT IN AN ORGANIZED HEALTH CARE EDUCATION/TRAINING PROGRAM
Payer: COMMERCIAL

## 2024-02-02 ENCOUNTER — HOSPITAL ENCOUNTER (EMERGENCY)
Facility: MEDICAL CENTER | Age: 34
End: 2024-02-02
Attending: STUDENT IN AN ORGANIZED HEALTH CARE EDUCATION/TRAINING PROGRAM
Payer: COMMERCIAL

## 2024-02-02 VITALS
DIASTOLIC BLOOD PRESSURE: 92 MMHG | BODY MASS INDEX: 38.34 KG/M2 | HEIGHT: 62 IN | WEIGHT: 208.34 LBS | OXYGEN SATURATION: 97 % | HEART RATE: 127 BPM | SYSTOLIC BLOOD PRESSURE: 162 MMHG | TEMPERATURE: 98.2 F | RESPIRATION RATE: 16 BRPM

## 2024-02-02 DIAGNOSIS — S61.411A LACERATION OF RIGHT HAND WITHOUT FOREIGN BODY, INITIAL ENCOUNTER: ICD-10-CM

## 2024-02-02 PROCEDURE — 99284 EMERGENCY DEPT VISIT MOD MDM: CPT

## 2024-02-02 PROCEDURE — 304999 HCHG REPAIR-SIMPLE/INTERMED LEVEL 1

## 2024-02-02 PROCEDURE — 700111 HCHG RX REV CODE 636 W/ 250 OVERRIDE (IP): Performed by: STUDENT IN AN ORGANIZED HEALTH CARE EDUCATION/TRAINING PROGRAM

## 2024-02-02 PROCEDURE — 73130 X-RAY EXAM OF HAND: CPT | Mod: RT

## 2024-02-02 PROCEDURE — 304217 HCHG IRRIGATION SYSTEM

## 2024-02-02 PROCEDURE — 700101 HCHG RX REV CODE 250: Mod: UD | Performed by: STUDENT IN AN ORGANIZED HEALTH CARE EDUCATION/TRAINING PROGRAM

## 2024-02-02 PROCEDURE — 90715 TDAP VACCINE 7 YRS/> IM: CPT | Performed by: STUDENT IN AN ORGANIZED HEALTH CARE EDUCATION/TRAINING PROGRAM

## 2024-02-02 PROCEDURE — 90471 IMMUNIZATION ADMIN: CPT

## 2024-02-02 PROCEDURE — 303747 HCHG EXTRA SUTURE

## 2024-02-02 RX ADMIN — LIDOCAINE HYDROCHLORIDE 20 ML: 10; .005 INJECTION, SOLUTION EPIDURAL; INFILTRATION; INTRACAUDAL; PERINEURAL at 22:28

## 2024-02-02 RX ADMIN — CLOSTRIDIUM TETANI TOXOID ANTIGEN (FORMALDEHYDE INACTIVATED), CORYNEBACTERIUM DIPHTHERIAE TOXOID ANTIGEN (FORMALDEHYDE INACTIVATED), BORDETELLA PERTUSSIS TOXOID ANTIGEN (GLUTARALDEHYDE INACTIVATED), BORDETELLA PERTUSSIS FILAMENTOUS HEMAGGLUTININ ANTIGEN (FORMALDEHYDE INACTIVATED), BORDETELLA PERTUSSIS PERTACTIN ANTIGEN, AND BORDETELLA PERTUSSIS FIMBRIAE 2/3 ANTIGEN 0.5 ML: 5; 2; 2.5; 5; 3; 5 INJECTION, SUSPENSION INTRAMUSCULAR at 23:00

## 2024-02-02 ASSESSMENT — FIBROSIS 4 INDEX: FIB4 SCORE: 0.28

## 2024-02-02 ASSESSMENT — PAIN DESCRIPTION - PAIN TYPE: TYPE: ACUTE PAIN

## 2024-02-03 NOTE — DISCHARGE INSTRUCTIONS
You had a laceration repaired in the emergency department today.  Please keep the area generally clean and dry, do not soak in water. You can clean it once daily gently with a damp washcloth and hand soap. You can also apply a small amount of triple antibiotic ointment to it daily. Please follow-up with primary care physician, urgent care or this facility for suture/staple removal in 12-14 days.  Return to the emergency department sooner if it appears that the laceration has an infection with signs of red streaking or pus, fever or severe swelling or pain at the laceration site.

## 2024-02-03 NOTE — ED TRIAGE NOTES
"Chief Complaint   Patient presents with    Hand Laceration     Pt cut R hand attempting to catch a beer bottle. Pt has two large lacs on palm of R hand. Bleeding controlled. +CMS +etoh      Pt ambulatory to triage for above complaint.     Educated on triage process. Placed back in lobby and encouraged to alert staff to any changes.     A+Ox4, GCS 15    BP (!) 162/92   Pulse (!) 127   Temp 36.8 °C (98.2 °F) (Temporal)   Resp 16   Ht 1.575 m (5' 2\")   Wt 94.5 kg (208 lb 5.4 oz)   SpO2 97%   BMI 38.10 kg/m²     "

## 2024-02-03 NOTE — ED PROVIDER NOTES
ED Provider Note    CHIEF COMPLAINT  Chief Complaint   Patient presents with    Hand Laceration     Pt cut R hand attempting to catch a beer bottle. Pt has two large lacs on palm of R hand. Bleeding controlled. +CMS +etoh        EXTERNAL RECORDS REVIEWED  Outpatient Notes records demonstrate patient's last Tdap was administered in 2012 and she is due for an update.    HPI/ROS  LIMITATION TO HISTORY   Select: : None      Natalya Salguero is a 33 y.o. female who presents to the emergency department for evaluation of a right hand laceration.  Patient is a right-hand-dominant female that states she was attempting to tap the top of a second beer bottle while holding a beer bottle that shattered in her hand cutting her palm.  She reports significant associated bleeding.  She denies numbness or weakness of the fingers or hand.  She denies additional injuries.  She reports moderate associated pain    PAST MEDICAL HISTORY   has a past medical history of Anemia, Anesthesia, Anxiety, Chronic GERD (2/28/2020), Depression, Gastric ulcer, chronic, Pregnancy, Psychiatric problem, Ulcer (2009), and Ulcer (09/2009).    SURGICAL HISTORY   has a past surgical history that includes other abdominal surgery (2009); repeat c section (9/14/2017); laparoscopy (3/22/2010); primary c section (11/19/2012); and abdominal exploration (09/2009).    FAMILY HISTORY  Family History   Problem Relation Age of Onset    Diabetes Mother     Hyperlipidemia Mother     Hypertension Mother     Heart Disease Mother     Hyperlipidemia Father     Cancer Maternal Grandmother         lung cancer       SOCIAL HISTORY  Social History     Tobacco Use    Smoking status: Every Day     Current packs/day: 0.12     Average packs/day: 0.1 packs/day for 6.0 years (0.7 ttl pk-yrs)     Types: Cigarettes    Smokeless tobacco: Never   Substance and Sexual Activity    Alcohol use: Yes     Alcohol/week: 0.0 oz     Comment: Socially    Drug use: No    Sexual activity: Not  "Currently     Partners: Male     Birth control/protection: Condom       CURRENT MEDICATIONS  Home Medications       Reviewed by Jamil Colbert R.N. (Registered Nurse) on 02/02/24 at 2207  Med List Status: Partial     Medication Last Dose Status   acyclovir (ZOVIRAX) 400 MG tablet  Active   benzonatate (TESSALON) 100 MG Cap  Active   cetirizine (ZYRTEC) 10 MG Tab  Active   hydrOXYzine HCl (ATARAX) 25 MG Tab  Active   ipratropium (ATROVENT) 0.06 % Solution  Active   mag hydrox-al hydrox-simeth (MAALOX PLUS ES OR MYLANTA DS) 400-400-40 MG/5ML Suspension  Active   omeprazole (PRILOSEC) 20 MG delayed-release capsule  Active   ondansetron (ZOFRAN ODT) 4 MG TABLET DISPERSIBLE  Active   sertraline (ZOLOFT) 100 MG Tab  Active   sucralfate (CARAFATE) 1 GM/10ML Suspension  Active                    ALLERGIES  Allergies   Allergen Reactions    Other Environmental      Adhesive tape causes blisters       PHYSICAL EXAM  VITAL SIGNS: BP (!) 162/92   Pulse (!) 127   Temp 36.8 °C (98.2 °F) (Temporal)   Resp 16   Ht 1.575 m (5' 2\")   Wt 94.5 kg (208 lb 5.4 oz)   SpO2 97%   BMI 38.10 kg/m²    Constitutional: No acute distress, slurring words and somewhat intoxicated.  HENT: Normocephalic, Atraumatic  Cardiovascular: 2+ radial pulse on the right and capillary refill less than 2 seconds in all fingers of the right hand  Musculoskeletal: No bony deformity or tenderness of the right wrist or right hand or fingers of the right hand.  Full range of motion at all joints  Skin: Warm, Dry, 2 irregular lacerations to the palm of the right hand with slow venous bleeding  Neurologic: Alert and oriented x 3, 5 out of 5 strength and normal sensation with median radial and ulnar nerve testing of the right upper extremity  Psychiatric: Appropriate affect for situation      DIAGNOSTIC STUDIES / PROCEDURES    RADIOLOGY  I have independently interpreted the diagnostic imaging associated with this visit and am waiting the final reading from " the radiologist.   My preliminary interpretation is as follows: No underlying bony fracture or retained foreign body  Radiologist interpretation:   DX-HAND 3+ RIGHT   Final Result         No acute osseous abnormality.        Laceration Repair Procedure Note    Indication: Laceration    Procedure: The patient was placed in the appropriate position and anesthesia around the laceration was obtained by infiltration using 2% Lidocaine with epinephrine. The wound was minimally contaminated .The area was then irrigated with high pressure normal saline. The laceration was closed with 4-0 Ethilon using interrupted sutures. There were no additional lacerations requiring repair. The wound area was then dressed with bacitracin, a bandage, and gauze.      Total repaired wound length: 6 cm.     Other Items: Suture count: 9    The patient tolerated the procedure well.    Complications: None      COURSE & MEDICAL DECISION MAKING    ED Observation Status? No; Patient does not meet criteria for ED Observation.     INITIAL ASSESSMENT, COURSE AND PLAN  Care Narrative:     Patient presents to the emergency department for evaluation of a right hand laceration sustained in the setting of alcohol intoxication.  Extremity is neurovascularly intact and bleeding easily controlled with direct pressure.  X-ray obtained with no underlying bony deformity or foreign body and wound explored with no foreign body visualized.  Injuries were irrigated copiously and repaired as above and wound care instructions, suture care discussed with the patient and written instructions provided.  Tdap updated.  She will follow-up with her doctor in 12 to 14 days for suture removal.  While she remains clinically somewhat intoxicated she is ambulatory without difficulty, tolerating p.o. and has a sober ride present to drive her home therefore I feel she can safely discharge.  Return precautions discussed and all questions answered and she was discharged in stable  condition.      ADDITIONAL PROBLEM LIST  Tdap requiring update    DISPOSITION AND DISCUSSIONS  I have discussed management of the patient with the following physicians and TINO's: None    Discussion of management with other QHP or appropriate source(s): None     FINAL IMPRESSION  1. Laceration of right hand without foreign body, initial encounter        PRESCRIPTIONS  New Prescriptions    No medications on file       FOLLOW UP  [facility], Emergency Dept  [address] [facility] [address]  [phone]    As needed, If symptoms worsen    Your doctor in 12-14 days to have these removed              -DISCHARGE-      Electronically signed by: Janes Dolan M.D., 2/2/2024 10:13 PM

## 2024-02-08 PROBLEM — S61.411A HAND LACERATION INVOLVING TENDON, RIGHT, INITIAL ENCOUNTER: Status: ACTIVE | Noted: 2024-02-08

## 2024-02-08 PROBLEM — S66.921A HAND LACERATION INVOLVING TENDON, RIGHT, INITIAL ENCOUNTER: Status: ACTIVE | Noted: 2024-02-08
